# Patient Record
Sex: FEMALE | Race: WHITE | NOT HISPANIC OR LATINO
[De-identification: names, ages, dates, MRNs, and addresses within clinical notes are randomized per-mention and may not be internally consistent; named-entity substitution may affect disease eponyms.]

---

## 2017-06-05 ENCOUNTER — RESULT REVIEW (OUTPATIENT)
Age: 47
End: 2017-06-05

## 2018-03-16 ENCOUNTER — APPOINTMENT (OUTPATIENT)
Dept: INTERNAL MEDICINE | Facility: CLINIC | Age: 48
End: 2018-03-16

## 2019-04-05 ENCOUNTER — TRANSCRIPTION ENCOUNTER (OUTPATIENT)
Age: 49
End: 2019-04-05

## 2019-04-05 ENCOUNTER — APPOINTMENT (OUTPATIENT)
Age: 49
End: 2019-04-05
Payer: COMMERCIAL

## 2019-04-05 ENCOUNTER — ASOB RESULT (OUTPATIENT)
Age: 49
End: 2019-04-05

## 2019-04-05 PROCEDURE — 76857 US EXAM PELVIC LIMITED: CPT | Mod: 59

## 2019-04-05 PROCEDURE — 76830 TRANSVAGINAL US NON-OB: CPT

## 2020-05-05 ENCOUNTER — TRANSCRIPTION ENCOUNTER (OUTPATIENT)
Age: 50
End: 2020-05-05

## 2020-07-27 ENCOUNTER — TRANSCRIPTION ENCOUNTER (OUTPATIENT)
Age: 50
End: 2020-07-27

## 2020-10-07 ENCOUNTER — TRANSCRIPTION ENCOUNTER (OUTPATIENT)
Age: 50
End: 2020-10-07

## 2021-03-19 ENCOUNTER — TRANSCRIPTION ENCOUNTER (OUTPATIENT)
Age: 51
End: 2021-03-19

## 2021-03-20 ENCOUNTER — INPATIENT (INPATIENT)
Facility: HOSPITAL | Age: 51
LOS: 8 days | Discharge: HOME | DRG: 906 | End: 2021-03-29
Attending: PLASTIC SURGERY | Admitting: PLASTIC SURGERY
Payer: COMMERCIAL

## 2021-03-20 ENCOUNTER — EMERGENCY (EMERGENCY)
Facility: HOSPITAL | Age: 51
LOS: 0 days | Discharge: HOME | End: 2021-03-20
Attending: STUDENT IN AN ORGANIZED HEALTH CARE EDUCATION/TRAINING PROGRAM
Payer: COMMERCIAL

## 2021-03-20 VITALS
SYSTOLIC BLOOD PRESSURE: 144 MMHG | TEMPERATURE: 98 F | RESPIRATION RATE: 18 BRPM | HEART RATE: 88 BPM | DIASTOLIC BLOOD PRESSURE: 93 MMHG | OXYGEN SATURATION: 100 %

## 2021-03-20 VITALS
TEMPERATURE: 98 F | HEART RATE: 73 BPM | SYSTOLIC BLOOD PRESSURE: 134 MMHG | DIASTOLIC BLOOD PRESSURE: 106 MMHG | OXYGEN SATURATION: 100 % | RESPIRATION RATE: 18 BRPM

## 2021-03-20 VITALS
SYSTOLIC BLOOD PRESSURE: 148 MMHG | DIASTOLIC BLOOD PRESSURE: 97 MMHG | HEIGHT: 63 IN | HEART RATE: 76 BPM | RESPIRATION RATE: 16 BRPM | OXYGEN SATURATION: 100 % | WEIGHT: 115.08 LBS

## 2021-03-20 DIAGNOSIS — S68.125A PARTIAL TRAUMATIC METACARPOPHALANGEAL AMPUTATION OF LEFT RING FINGER, INITIAL ENCOUNTER: ICD-10-CM

## 2021-03-20 DIAGNOSIS — Z20.822 CONTACT WITH AND (SUSPECTED) EXPOSURE TO COVID-19: ICD-10-CM

## 2021-03-20 DIAGNOSIS — S68.115A COMPLETE TRAUMATIC METACARPOPHALANGEAL AMPUTATION OF LEFT RING FINGER, INITIAL ENCOUNTER: ICD-10-CM

## 2021-03-20 DIAGNOSIS — S68.119A COMPLETE TRAUMATIC METACARPOPHALANGEAL AMPUTATION OF UNSPECIFIED FINGER, INITIAL ENCOUNTER: ICD-10-CM
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
## 2021-03-20 DIAGNOSIS — Z23 ENCOUNTER FOR IMMUNIZATION: ICD-10-CM

## 2021-03-20 DIAGNOSIS — Z98.890 OTHER SPECIFIED POSTPROCEDURAL STATES: Chronic | ICD-10-CM

## 2021-03-20 LAB
ALBUMIN SERPL ELPH-MCNC: 3.8 G/DL — SIGNIFICANT CHANGE UP (ref 3.3–5)
ALP SERPL-CCNC: 38 U/L — LOW (ref 40–120)
ALT FLD-CCNC: 20 U/L — SIGNIFICANT CHANGE UP (ref 12–78)
ANION GAP SERPL CALC-SCNC: 11 MMOL/L — SIGNIFICANT CHANGE UP (ref 5–17)
ANION GAP SERPL CALC-SCNC: 4 MMOL/L — LOW (ref 5–17)
APTT BLD: 27.3 SEC — LOW (ref 27.5–35.5)
APTT BLD: 41.1 SEC — HIGH (ref 27.5–35.5)
AST SERPL-CCNC: 16 U/L — SIGNIFICANT CHANGE UP (ref 15–37)
BASE EXCESS BLDV CALC-SCNC: -0.7 MMOL/L — SIGNIFICANT CHANGE UP (ref -2–2)
BASOPHILS # BLD AUTO: 0.04 K/UL — SIGNIFICANT CHANGE UP (ref 0–0.2)
BASOPHILS NFR BLD AUTO: 0.8 % — SIGNIFICANT CHANGE UP (ref 0–2)
BILIRUB SERPL-MCNC: 0.6 MG/DL — SIGNIFICANT CHANGE UP (ref 0.2–1.2)
BLD GP AB SCN SERPL QL: NEGATIVE — SIGNIFICANT CHANGE UP
BLD GP AB SCN SERPL QL: SIGNIFICANT CHANGE UP
BUN SERPL-MCNC: 11 MG/DL — SIGNIFICANT CHANGE UP (ref 7–23)
BUN SERPL-MCNC: 17 MG/DL — SIGNIFICANT CHANGE UP (ref 7–23)
CA-I SERPL-SCNC: 1.12 MMOL/L — SIGNIFICANT CHANGE UP (ref 1.12–1.3)
CALCIUM SERPL-MCNC: 7.4 MG/DL — LOW (ref 8.4–10.5)
CALCIUM SERPL-MCNC: 9.3 MG/DL — SIGNIFICANT CHANGE UP (ref 8.5–10.1)
CHLORIDE BLDV-SCNC: 109 MMOL/L — HIGH (ref 96–108)
CHLORIDE SERPL-SCNC: 107 MMOL/L — SIGNIFICANT CHANGE UP (ref 96–108)
CHLORIDE SERPL-SCNC: 107 MMOL/L — SIGNIFICANT CHANGE UP (ref 96–108)
CO2 BLDV-SCNC: 23 MMOL/L — SIGNIFICANT CHANGE UP (ref 22–30)
CO2 SERPL-SCNC: 20 MMOL/L — LOW (ref 22–31)
CO2 SERPL-SCNC: 30 MMOL/L — SIGNIFICANT CHANGE UP (ref 22–31)
CREAT SERPL-MCNC: 0.72 MG/DL — SIGNIFICANT CHANGE UP (ref 0.5–1.3)
CREAT SERPL-MCNC: 1.07 MG/DL — SIGNIFICANT CHANGE UP (ref 0.5–1.3)
EOSINOPHIL # BLD AUTO: 0.13 K/UL — SIGNIFICANT CHANGE UP (ref 0–0.5)
EOSINOPHIL NFR BLD AUTO: 2.5 % — SIGNIFICANT CHANGE UP (ref 0–6)
GAS PNL BLDA: SIGNIFICANT CHANGE UP
GAS PNL BLDV: 139 MMOL/L — SIGNIFICANT CHANGE UP (ref 135–145)
GAS PNL BLDV: SIGNIFICANT CHANGE UP
GLUCOSE BLDV-MCNC: 99 MG/DL — SIGNIFICANT CHANGE UP (ref 70–99)
GLUCOSE SERPL-MCNC: 127 MG/DL — HIGH (ref 70–99)
GLUCOSE SERPL-MCNC: 93 MG/DL — SIGNIFICANT CHANGE UP (ref 70–99)
HCO3 BLDV-SCNC: 22 MMOL/L — SIGNIFICANT CHANGE UP (ref 21–29)
HCT VFR BLD CALC: 31.8 % — LOW (ref 34.5–45)
HCT VFR BLD CALC: 39.2 % — SIGNIFICANT CHANGE UP (ref 34.5–45)
HCT VFR BLDA CALC: 35 % — LOW (ref 39–50)
HGB BLD CALC-MCNC: 11.4 G/DL — LOW (ref 11.5–15.5)
HGB BLD-MCNC: 10.4 G/DL — LOW (ref 11.5–15.5)
HGB BLD-MCNC: 12.9 G/DL — SIGNIFICANT CHANGE UP (ref 11.5–15.5)
IMM GRANULOCYTES NFR BLD AUTO: 0.2 % — SIGNIFICANT CHANGE UP (ref 0–1.5)
INR BLD: 0.98 RATIO — SIGNIFICANT CHANGE UP (ref 0.88–1.16)
INR BLD: 0.99 RATIO — SIGNIFICANT CHANGE UP (ref 0.88–1.16)
LACTATE BLDV-MCNC: 0.6 MMOL/L — LOW (ref 0.7–2)
LYMPHOCYTES # BLD AUTO: 1.67 K/UL — SIGNIFICANT CHANGE UP (ref 1–3.3)
LYMPHOCYTES # BLD AUTO: 31.8 % — SIGNIFICANT CHANGE UP (ref 13–44)
MAGNESIUM SERPL-MCNC: 1.8 MG/DL — SIGNIFICANT CHANGE UP (ref 1.6–2.6)
MCHC RBC-ENTMCNC: 32.2 PG — SIGNIFICANT CHANGE UP (ref 27–34)
MCHC RBC-ENTMCNC: 32.4 PG — SIGNIFICANT CHANGE UP (ref 27–34)
MCHC RBC-ENTMCNC: 32.7 GM/DL — SIGNIFICANT CHANGE UP (ref 32–36)
MCHC RBC-ENTMCNC: 32.9 GM/DL — SIGNIFICANT CHANGE UP (ref 32–36)
MCV RBC AUTO: 98.5 FL — SIGNIFICANT CHANGE UP (ref 80–100)
MCV RBC AUTO: 98.5 FL — SIGNIFICANT CHANGE UP (ref 80–100)
MONOCYTES # BLD AUTO: 0.46 K/UL — SIGNIFICANT CHANGE UP (ref 0–0.9)
MONOCYTES NFR BLD AUTO: 8.8 % — SIGNIFICANT CHANGE UP (ref 2–14)
NEUTROPHILS # BLD AUTO: 2.94 K/UL — SIGNIFICANT CHANGE UP (ref 1.8–7.4)
NEUTROPHILS NFR BLD AUTO: 55.9 % — SIGNIFICANT CHANGE UP (ref 43–77)
NRBC # BLD: 0 /100 WBCS — SIGNIFICANT CHANGE UP (ref 0–0)
OTHER CELLS CSF MANUAL: 17 ML/DL — LOW (ref 18–22)
PCO2 BLDV: 33 MMHG — LOW (ref 35–50)
PH BLDV: 7.45 — SIGNIFICANT CHANGE UP (ref 7.35–7.45)
PHOSPHATE SERPL-MCNC: 3 MG/DL — SIGNIFICANT CHANGE UP (ref 2.5–4.5)
PLATELET # BLD AUTO: 199 K/UL — SIGNIFICANT CHANGE UP (ref 150–400)
PLATELET # BLD AUTO: 257 K/UL — SIGNIFICANT CHANGE UP (ref 150–400)
PO2 BLDV: 477 MMHG — HIGH (ref 25–45)
POTASSIUM BLDV-SCNC: 3.5 MMOL/L — SIGNIFICANT CHANGE UP (ref 3.5–5.3)
POTASSIUM SERPL-MCNC: 4 MMOL/L — SIGNIFICANT CHANGE UP (ref 3.5–5.3)
POTASSIUM SERPL-MCNC: 4 MMOL/L — SIGNIFICANT CHANGE UP (ref 3.5–5.3)
POTASSIUM SERPL-SCNC: 4 MMOL/L — SIGNIFICANT CHANGE UP (ref 3.5–5.3)
POTASSIUM SERPL-SCNC: 4 MMOL/L — SIGNIFICANT CHANGE UP (ref 3.5–5.3)
PROT SERPL-MCNC: 7.1 GM/DL — SIGNIFICANT CHANGE UP (ref 6–8.3)
PROTHROM AB SERPL-ACNC: 11.4 SEC — SIGNIFICANT CHANGE UP (ref 10.6–13.6)
PROTHROM AB SERPL-ACNC: 11.9 SEC — SIGNIFICANT CHANGE UP (ref 10.6–13.6)
RBC # BLD: 3.23 M/UL — LOW (ref 3.8–5.2)
RBC # BLD: 3.98 M/UL — SIGNIFICANT CHANGE UP (ref 3.8–5.2)
RBC # FLD: 13 % — SIGNIFICANT CHANGE UP (ref 10.3–14.5)
RBC # FLD: 13 % — SIGNIFICANT CHANGE UP (ref 10.3–14.5)
RH IG SCN BLD-IMP: NEGATIVE — SIGNIFICANT CHANGE UP
SAO2 % BLDV: 100 % — HIGH (ref 67–88)
SARS-COV-2 RNA SPEC QL NAA+PROBE: SIGNIFICANT CHANGE UP
SODIUM SERPL-SCNC: 138 MMOL/L — SIGNIFICANT CHANGE UP (ref 135–145)
SODIUM SERPL-SCNC: 141 MMOL/L — SIGNIFICANT CHANGE UP (ref 135–145)
WBC # BLD: 5.25 K/UL — SIGNIFICANT CHANGE UP (ref 3.8–10.5)
WBC # BLD: 6.99 K/UL — SIGNIFICANT CHANGE UP (ref 3.8–10.5)
WBC # FLD AUTO: 5.25 K/UL — SIGNIFICANT CHANGE UP (ref 3.8–10.5)
WBC # FLD AUTO: 6.99 K/UL — SIGNIFICANT CHANGE UP (ref 3.8–10.5)

## 2021-03-20 PROCEDURE — 90715 TDAP VACCINE 7 YRS/> IM: CPT

## 2021-03-20 PROCEDURE — 86850 RBC ANTIBODY SCREEN: CPT

## 2021-03-20 PROCEDURE — 20822 REPLANTATION DIGIT COMPLETE: CPT | Mod: F3

## 2021-03-20 PROCEDURE — 36415 COLL VENOUS BLD VENIPUNCTURE: CPT

## 2021-03-20 PROCEDURE — U0003: CPT

## 2021-03-20 PROCEDURE — U0005: CPT

## 2021-03-20 PROCEDURE — 96375 TX/PRO/DX INJ NEW DRUG ADDON: CPT

## 2021-03-20 PROCEDURE — 85610 PROTHROMBIN TIME: CPT

## 2021-03-20 PROCEDURE — 85730 THROMBOPLASTIN TIME PARTIAL: CPT

## 2021-03-20 PROCEDURE — 85025 COMPLETE CBC W/AUTO DIFF WBC: CPT

## 2021-03-20 PROCEDURE — 29125 APPL SHORT ARM SPLINT STATIC: CPT | Mod: 59

## 2021-03-20 PROCEDURE — 90471 IMMUNIZATION ADMIN: CPT

## 2021-03-20 PROCEDURE — 73130 X-RAY EXAM OF HAND: CPT | Mod: LT

## 2021-03-20 PROCEDURE — 35236 REPAIR BLVSL VN GRF UXTR: CPT

## 2021-03-20 PROCEDURE — 99285 EMERGENCY DEPT VISIT HI MDM: CPT

## 2021-03-20 PROCEDURE — 86901 BLOOD TYPING SEROLOGIC RH(D): CPT

## 2021-03-20 PROCEDURE — 86900 BLOOD TYPING SEROLOGIC ABO: CPT

## 2021-03-20 PROCEDURE — 64912 NRV RPR W/NRV ALGRFT 1ST: CPT | Mod: F3

## 2021-03-20 PROCEDURE — 96374 THER/PROPH/DIAG INJ IV PUSH: CPT

## 2021-03-20 PROCEDURE — 80053 COMPREHEN METABOLIC PANEL: CPT

## 2021-03-20 PROCEDURE — 96376 TX/PRO/DX INJ SAME DRUG ADON: CPT

## 2021-03-20 PROCEDURE — 73130 X-RAY EXAM OF HAND: CPT | Mod: 26,LT

## 2021-03-20 RX ORDER — CHLORHEXIDINE GLUCONATE 213 G/1000ML
1 SOLUTION TOPICAL
Refills: 0 | Status: DISCONTINUED | OUTPATIENT
Start: 2021-03-21 | End: 2021-03-29

## 2021-03-20 RX ORDER — CEFAZOLIN SODIUM 1 G
1000 VIAL (EA) INJECTION ONCE
Refills: 0 | Status: DISCONTINUED | OUTPATIENT
Start: 2021-03-20 | End: 2021-03-20

## 2021-03-20 RX ORDER — CEFAZOLIN SODIUM 1 G
1000 VIAL (EA) INJECTION ONCE
Refills: 0 | Status: COMPLETED | OUTPATIENT
Start: 2021-03-20 | End: 2021-03-20

## 2021-03-20 RX ORDER — ENOXAPARIN SODIUM 100 MG/ML
40 INJECTION SUBCUTANEOUS DAILY
Refills: 0 | Status: DISCONTINUED | OUTPATIENT
Start: 2021-03-20 | End: 2021-03-29

## 2021-03-20 RX ORDER — ONDANSETRON 8 MG/1
4 TABLET, FILM COATED ORAL ONCE
Refills: 0 | Status: COMPLETED | OUTPATIENT
Start: 2021-03-20 | End: 2021-03-20

## 2021-03-20 RX ORDER — SODIUM CHLORIDE 9 MG/ML
1000 INJECTION INTRAMUSCULAR; INTRAVENOUS; SUBCUTANEOUS ONCE
Refills: 0 | Status: COMPLETED | OUTPATIENT
Start: 2021-03-20 | End: 2021-03-20

## 2021-03-20 RX ORDER — POLYETHYLENE GLYCOL 3350 17 G/17G
17 POWDER, FOR SOLUTION ORAL DAILY
Refills: 0 | Status: DISCONTINUED | OUTPATIENT
Start: 2021-03-20 | End: 2021-03-29

## 2021-03-20 RX ORDER — ENOXAPARIN SODIUM 100 MG/ML
30 INJECTION SUBCUTANEOUS ONCE
Refills: 0 | Status: DISCONTINUED | OUTPATIENT
Start: 2021-03-20 | End: 2021-03-20

## 2021-03-20 RX ORDER — SENNA PLUS 8.6 MG/1
2 TABLET ORAL AT BEDTIME
Refills: 0 | Status: DISCONTINUED | OUTPATIENT
Start: 2021-03-20 | End: 2021-03-29

## 2021-03-20 RX ORDER — TETANUS TOXOID, REDUCED DIPHTHERIA TOXOID AND ACELLULAR PERTUSSIS VACCINE, ADSORBED 5; 2.5; 8; 8; 2.5 [IU]/.5ML; [IU]/.5ML; UG/.5ML; UG/.5ML; UG/.5ML
0.5 SUSPENSION INTRAMUSCULAR ONCE
Refills: 0 | Status: COMPLETED | OUTPATIENT
Start: 2021-03-20 | End: 2021-03-20

## 2021-03-20 RX ORDER — MORPHINE SULFATE 50 MG/1
2 CAPSULE, EXTENDED RELEASE ORAL ONCE
Refills: 0 | Status: DISCONTINUED | OUTPATIENT
Start: 2021-03-20 | End: 2021-03-20

## 2021-03-20 RX ORDER — SODIUM CHLORIDE 9 MG/ML
1000 INJECTION, SOLUTION INTRAVENOUS
Refills: 0 | Status: DISCONTINUED | OUTPATIENT
Start: 2021-03-20 | End: 2021-03-20

## 2021-03-20 RX ORDER — MAGNESIUM SULFATE 500 MG/ML
2 VIAL (ML) INJECTION ONCE
Refills: 0 | Status: COMPLETED | OUTPATIENT
Start: 2021-03-20 | End: 2021-03-20

## 2021-03-20 RX ORDER — CIPROFLOXACIN LACTATE 400MG/40ML
500 VIAL (ML) INTRAVENOUS EVERY 12 HOURS
Refills: 0 | Status: DISCONTINUED | OUTPATIENT
Start: 2021-03-21 | End: 2021-03-29

## 2021-03-20 RX ORDER — MORPHINE SULFATE 50 MG/1
4 CAPSULE, EXTENDED RELEASE ORAL ONCE
Refills: 0 | Status: DISCONTINUED | OUTPATIENT
Start: 2021-03-20 | End: 2021-03-20

## 2021-03-20 RX ORDER — ASPIRIN/CALCIUM CARB/MAGNESIUM 324 MG
81 TABLET ORAL DAILY
Refills: 0 | Status: DISCONTINUED | OUTPATIENT
Start: 2021-03-21 | End: 2021-03-29

## 2021-03-20 RX ORDER — ACETAMINOPHEN 500 MG
650 TABLET ORAL EVERY 6 HOURS
Refills: 0 | Status: DISCONTINUED | OUTPATIENT
Start: 2021-03-20 | End: 2021-03-29

## 2021-03-20 RX ORDER — ENOXAPARIN SODIUM 100 MG/ML
40 INJECTION SUBCUTANEOUS ONCE
Refills: 0 | Status: DISCONTINUED | OUTPATIENT
Start: 2021-03-20 | End: 2021-03-20

## 2021-03-20 RX ORDER — OXYCODONE HYDROCHLORIDE 5 MG/1
5 TABLET ORAL EVERY 4 HOURS
Refills: 0 | Status: DISCONTINUED | OUTPATIENT
Start: 2021-03-20 | End: 2021-03-24

## 2021-03-20 RX ADMIN — TETANUS TOXOID, REDUCED DIPHTHERIA TOXOID AND ACELLULAR PERTUSSIS VACCINE, ADSORBED 0.5 MILLILITER(S): 5; 2.5; 8; 8; 2.5 SUSPENSION INTRAMUSCULAR at 13:41

## 2021-03-20 RX ADMIN — ONDANSETRON 4 MILLIGRAM(S): 8 TABLET, FILM COATED ORAL at 12:52

## 2021-03-20 RX ADMIN — Medication 1000 MILLIGRAM(S): at 12:45

## 2021-03-20 RX ADMIN — Medication 50 GRAM(S): at 23:32

## 2021-03-20 RX ADMIN — MORPHINE SULFATE 2 MILLIGRAM(S): 50 CAPSULE, EXTENDED RELEASE ORAL at 14:00

## 2021-03-20 RX ADMIN — SODIUM CHLORIDE 1000 MILLILITER(S): 9 INJECTION INTRAMUSCULAR; INTRAVENOUS; SUBCUTANEOUS at 12:52

## 2021-03-20 RX ADMIN — MORPHINE SULFATE 4 MILLIGRAM(S): 50 CAPSULE, EXTENDED RELEASE ORAL at 13:41

## 2021-03-20 RX ADMIN — MORPHINE SULFATE 4 MILLIGRAM(S): 50 CAPSULE, EXTENDED RELEASE ORAL at 12:51

## 2021-03-20 RX ADMIN — OXYCODONE HYDROCHLORIDE 5 MILLIGRAM(S): 5 TABLET ORAL at 23:32

## 2021-03-20 NOTE — ED ADULT NURSE NOTE - OBJECTIVE STATEMENT
Patient presents to the ED BIBEMS s/p slip and fall down 9 steps 45 min PTA. Pt was wearing socks while going down the steps, slipped, grabbed onto banister but kept sliding incurring +amputation of distal L 4th finger. Notes associated +pain to finger and LUE. No head injury or LOC. EMS with tip of finger wrapped in guaze on ice. Last Tetanus unknown. NKDA.

## 2021-03-20 NOTE — ED PROVIDER NOTE - CLINICAL SUMMARY MEDICAL DECISION MAKING FREE TEXT BOX
Attending Azucena Russell: 49 y/o female transferred after finger amputation. upon arrival met by reimplantation team. will take to or

## 2021-03-20 NOTE — H&P ADULT - ASSESSMENT
A/P: 50F w severed L RF at mid-P2; good candidate for replantation    - NPO  - Received TDaP at [place]  - IV Ancef  - OR for emergency replantation  - Consent signed and in chart  - Preop labs and w/u  - Anticipate SICU for postop recover  '  Seen and d/w Dr. Valdes

## 2021-03-20 NOTE — ED ADULT NURSE NOTE - NS ED NURSE DISCH DISPOSITION
Follow up visit to patients room. Patient wants to go home with hospice tomorrow. Patient is not sure what time to have equipment delivered. She asked that I speak with her . I tried to call [facility], phone goes straight to voicemail and the voicemail is full so I am unable to leave a message. I spoke with patient again and she was able to reach [employer]. [facility] Holley Escotoanita Final states that equipment can be delivered tomorrow 2-5pm as he will need to call the other company to  the equipment they have now. I explained that we will follow up tomorrow to finish discharge home tomorrow. Admitted

## 2021-03-20 NOTE — ED PROVIDER NOTE - OBJECTIVE STATEMENT
Attending Azucena Russell: 51 y/o female presenting with left finger pain. pt states tripped and fell and got her finger stuck in banister. and noticed that her finger was amputed. went to outside hospital and had xrays performed, received abx and transferred for reimplantation team. pt has finger with her. no h/o bleeding disroders. pt received pain control prior to arrival. denies any further injury

## 2021-03-20 NOTE — ED PROVIDER NOTE - ATTENDING CONTRIBUTION TO CARE
Attending MD Azucena Russell:  I personally have seen and examined this patient.  Resident note reviewed and agree on plan of care and except where noted.  See HPI, PE, and MDM for details.

## 2021-03-20 NOTE — ED ADULT TRIAGE NOTE - CHIEF COMPLAINT QUOTE
Pt. to the ED BIBA from Home C/O Left Hand finger injury (Amputation)-- Pt. states she trip and fell over steps and had finger injury- denies hitting head and LOC- Denies major medical hx and Blood thinners- GSC 15- TA to ED Called @ 1219pm

## 2021-03-20 NOTE — ED PROVIDER NOTE - OBJECTIVE STATEMENT
49 y/o F with PMHx of presents to the ED BIBEMS s/p slip and fall down 9 steps 45 min PTA. Pt was wearing socks while going down the steps, slipped, grabbed onto banister but kept sliding incurring +amputation of distal L 4th finger. Notes associated +pain to finger and LUE. No head injury or LOC. EMS with tip of finger wrapped in guaze on ice. Last Tetanus unknown. NKDA.

## 2021-03-20 NOTE — BRIEF OPERATIVE NOTE - OPERATION/FINDINGS
Replantation of severed left ring finger; vein graft from distal volar forearm; repair of radial and ulnar digital nerves with interpositional nerve graft; removal of nail plate

## 2021-03-20 NOTE — ED PROVIDER NOTE - PROGRESS NOTE DETAILS
Spoke with Dr. Denise- recommends transfer to [facility] to care of Dr. Sommers who he spoke with. pt agrees with transfer.

## 2021-03-20 NOTE — H&P ADULT - NSHPPHYSICALEXAM_GEN_ALL_CORE
GEN: Well-appearing, overall atraumatic; A&Ox3  LUE: Volar oblique severed L RF at the mid-middle phalanx; amputated part on ice

## 2021-03-20 NOTE — ED PROVIDER NOTE - PHYSICAL EXAMINATION
Attending Azucena Russell: Gen: NAD, heent: atrauamtic, eomi, perrla, mmm, op pink, uvula midline, neck; nttp, no nuchal rigidity, chest: nttp, no crepitus, cv: rrr, no murmurs, lungs: ctab, abd: soft, nontender, nondistended, no peritoneal signs, +BS, no guarding, ext: wwp, amputation of 4th digits left hand, hand bandaged , skin: no rash, neuro: awake and alert, following commands, speech clear, sensation and strength intact, no focal deficits   platic surgery at bedside do not want to unrap bandage plan to do it in or

## 2021-03-20 NOTE — H&P ADULT - HISTORY OF PRESENT ILLNESS
HPI: 50F LHD slipped down the stairs early today; used her left hand to try to grab the railings and as a result had her left ring finger avulsed from her hand. She was taken to [facility]; Xray and eval by an orthopedic surgeon was performed; deemed an appropriate replantation candidate and was transferred by helicopter to [facility].    At the time of consultation in the ER, pt was alert and comfortable and able to give full consent to the operative procedure.

## 2021-03-20 NOTE — CONSULT NOTE ADULT - SUBJECTIVE AND OBJECTIVE BOX
HISTORY OF PRESENT ILLNESS:  49 y/o female w/ a PMHx of depression who presented today to [facility] after slipping down the stairs. Patient attempted to grab the railing to break her fall, which resulted in amputation of the left ring finger. She was subsequently transferred to [facility] for reimplantation. Case was uneventful. SICU consulted for leech therapy and frequent neurovascular checks. Patient currently denies fevers, headache, dizziness, weakness, shortness of breath, chest pain, abdominal pain, or nausea/vomiting.    PAST MEDICAL HISTORY: Depression    PAST SURGICAL HISTORY:  - H/O bilateral breast reduction surgery  - H/O sinus surgery    FAMILY HISTORY: No pertinent family history in first degree relatives    SOCIAL HISTORY:    CODE STATUS:     HOME MEDICATIONS:    ALLERGIES: No Known Allergies      VITAL SIGNS:  ICU Vital Signs Last 24 Hrs  T(C): 36.1 (20 Mar 2021 21:55), Max: 36.7 (20 Mar 2021 15:40)  T(F): 97 (20 Mar 2021 21:55), Max: 98 (20 Mar 2021 15:40)  HR: 71 (20 Mar 2021 22:30) (71 - 77)  BP: 100/58 (20 Mar 2021 22:30) (100/58 - 134/106)  BP(mean): 75 (20 Mar 2021 22:30) (75 - 83)  ABP: --  ABP(mean): --  RR: 15 (20 Mar 2021 22:30) (15 - 18)  SpO2: 100% (20 Mar 2021 22:30) (100% - 100%)      NEURO  Exam:  Meds:acetaminophen   Tablet .. 650 milliGRAM(s) Oral every 6 hours PRN Mild Pain (1 - 3)  oxyCODONE    IR 5 milliGRAM(s) Oral every 4 hours PRN Severe Pain (7 - 10)      RESPIRATORY  Mechanical Ventilation:     Exam:  Meds:    CARDIOVASCULAR  VBG - ( 20 Mar 2021 17:44 )  pH: 7.45  /  pCO2: 33    /  pO2: 477   / HCO3: 22    / Base Excess: -0.7  /  SaO2: 100    Lactate: 0.6              Exam:  Cardiac Rhythm:  Meds:    GI/NUTRITION  Exam:  Diet:  Meds:polyethylene glycol 3350 17 Gram(s) Oral daily  senna 2 Tablet(s) Oral at bedtime      GENITOURINARY/RENAL  Meds:    03-20 @ 07:01  -  03-20 @ 22:50  --------------------------------------------------------  IN:  Total IN: 0 mL    OUT:    Indwelling Catheter - Urethral (mL): 75 mL  Total OUT: 75 mL    Total NET: -75 mL        Weight (kg): 44.3 (03-20 @ 21:55)        [ ] Croft catheter, indication: urine output monitoring in critically ill patient    HEMATOLOGIC  [ ] VTE Prophylaxis:  enoxaparin Injectable 40 milliGRAM(s) SubCutaneous daily                          10.4   6.99  )-----------( 199      ( 20 Mar 2021 22:26 )             31.8     PT/INR - ( 20 Mar 2021 22:26 )   PT: 11.9 sec;   INR: 0.99 ratio         PTT - ( 20 Mar 2021 22:26 )  PTT:41.1 sec  Transfusion: [ ] PRBC	[ ] Platelets	[ ] FFP	[ ] Cryoprecipitate      INFECTIOUS DISEASES  Meds:  RECENT CULTURES:      ENDOCRINE  Meds:  CAPILLARY BLOOD GLUCOSE          PATIENT CARE ACCESS DEVICES:  [ ] Peripheral IV  [ ] Central Venous Line	[ ] R	[ ] L	[ ] IJ	[ ] Fem	[ ] SC	Placed:   [ ] Arterial Line		[ ] R	[ ] L	[ ] Fem	[ ] Rad	[ ] Ax	Placed:   [ ] PICC:					[ ] Mediport  [ ] Urinary Catheter, Date Placed:   [x] Necessity of urinary, arterial, and venous catheters discussed    OTHER MEDICATIONS:     IMAGING STUDIES: HISTORY OF PRESENT ILLNESS:  51 y/o female w/ a PMHx of depression who presented today to [facility] after slipping down the stairs. Patient attempted to grab the railing to break her fall, which resulted in amputation of the left ring finger. She was subsequently transferred to [facility] for reimplantation. Case was uneventful. SICU consulted for leech therapy and frequent neurovascular checks. Patient currently denies fevers, headache, dizziness, weakness, shortness of breath, chest pain, abdominal pain, or nausea/vomiting.    PAST MEDICAL HISTORY: Depression    PAST SURGICAL HISTORY:  - H/O bilateral breast reduction surgery  - H/O sinus surgery    HOME MEDICATIONS: Wellbutrin    ALLERGIES: No Known Allergies    FAMILY HISTORY: No pertinent family history in first degree relatives    SOCIAL HISTORY: Non-contributory    VITAL SIGNS:  T(C): 36.1 (20 Mar 2021 21:55), Max: 36.7 (20 Mar 2021 15:40)  T(F): 97 (20 Mar 2021 21:55), Max: 98 (20 Mar 2021 15:40)  HR: 71 (20 Mar 2021 22:30) (71 - 77)  BP: 100/58 (20 Mar 2021 22:30) (100/58 - 134/106)  BP(mean): 75 (20 Mar 2021 22:30) (75 - 83)  RR: 15 (20 Mar 2021 22:30) (15 - 18)  SpO2: 100% (20 Mar 2021 22:30) (100% - 100%)    PHYSICAL EXAMINATION:  General - well-nourished, no acute distress  Neuro - awake, alert, oriented x4, no acute focal deficits  HEENT - normocephalic, PERRL, moist mucous membranes  Lungs - clear to auscultation bilaterally  Heart - regular rate and rhythm, S1S2  Abdomen - soft, nontender, nondistended  Extremities - left ring finger pink w/ good capillary refill, all other extremities are warm & pink with 2+ pulses, strength 5/5, sensation intact    LABS:                        10.4   6.99  )-----------( 199      ( 20 Mar 2021 22:26 )             31.8     03-20    138  |  107  |  11  ----------------------------<  127<H>  4.0   |  20<L>  |  0.72    Ca    7.4<L>      20 Mar 2021 22:26  Phos  3.0  Mg     1.8    PT/INR - ( 20 Mar 2021 22:26 )   PT: 11.9 sec;   INR: 0.99 ratio    PTT - ( 20 Mar 2021 22:26 )  PTT:41.1 sec    IMAGING STUDIES: None

## 2021-03-20 NOTE — ED PROVIDER NOTE - NS_ATTENDINGSCRIBE_ED_ALL_ED
I personally performed the service described in the documentation recorded by the scribe in my presence, and it accurately and completely records my words and actions.
Has The Growth Been Previously Biopsied?: has been previously biopsied
Body Location Override (Optional): left central lateral neck

## 2021-03-20 NOTE — ED PROVIDER NOTE - NS ED ROS FT
Constitutional: No fever.  Neurological: No headache.  Eyes: No vision changes.   Ears, Nose, Mouth, Throat: No congestion.  Cardiovascular: No chest pain.  Respiratory: No difficulty breathing.  Gastrointestinal: No nausea or vomiting.  Genitourinary: No dysuria.  Musculoskeletal: +L 4th finger amputation, +LUE pain  Integumentary (skin and/or breast): No rash.

## 2021-03-20 NOTE — ED PROVIDER NOTE - PHYSICAL EXAMINATION
Vital signs as available reviewed.  General:  Comfortable, no acute distress.  Head:  Normocephalic, atraumatic.  Eyes:  Conjunctiva pink, no icterus.  Cardiovascular:  Regular rate, no obvious murmur.  Respiratory:  Clear to auscultation, good air entry bilaterally.  Abdomen:  Soft, non-tender.  Musculoskeletal:  No deformity or calf tenderness. No midline spinal TTP.   Neurologic: Alert and oriented, moving all extremities.  Skin: +avulsion/amputation distal L 4th digit

## 2021-03-20 NOTE — CONSULT NOTE ADULT - ASSESSMENT
49 y/o female w/ a PMHx of depression presenting s/p fall w/ amputation of the left ring finger s/p reimplantation requiring frequent neurovascular checks    PLAN:    Neuro: acute post-op pain, depression  - Monitor mental status  - Pain control as needed with acetaminophen and oxycodone  - Will restart home Wellbutrin    Resp: no acute issues  - Monitor pulse oximeter  - Out of bed to chair, ambulate as tolerated, and incentive spirometry to prevent atelectasis    CV: no acute issues  - Monitor vital signs    GI: no acute issues  - Regular diet as tolerated  - Bowel regimen with senna & Miralax    Renal: no acute issues  - Monitor I&Os  - Monitor electrolytes and replete as necessary    Heme: s/p finger reimplantation  - Monitor CBC and coags  - Lovenox for VTE prophylaxis  - ASA 81 mg PO daily as per plastics in the setting of her reimplanted finger    ID: leech therapy  - Monitor for clinical evidence of active infection  - Empiric ciprofloxacin while on leech therapy    Endo: no acute issues  - Monitor glucose on BMP    Code Status: Full code    [facility]     [id number]

## 2021-03-21 LAB
HCT VFR BLD CALC: 30 % — LOW (ref 34.5–45)
HGB BLD-MCNC: 9.8 G/DL — LOW (ref 11.5–15.5)
MCHC RBC-ENTMCNC: 32.7 GM/DL — SIGNIFICANT CHANGE UP (ref 32–36)
MCHC RBC-ENTMCNC: 32.7 PG — SIGNIFICANT CHANGE UP (ref 27–34)
MCV RBC AUTO: 100 FL — SIGNIFICANT CHANGE UP (ref 80–100)
NRBC # BLD: 0 /100 WBCS — SIGNIFICANT CHANGE UP (ref 0–0)
PLATELET # BLD AUTO: 212 K/UL — SIGNIFICANT CHANGE UP (ref 150–400)
RBC # BLD: 3 M/UL — LOW (ref 3.8–5.2)
RBC # FLD: 13.2 % — SIGNIFICANT CHANGE UP (ref 10.3–14.5)
WBC # BLD: 8.3 K/UL — SIGNIFICANT CHANGE UP (ref 3.8–10.5)
WBC # FLD AUTO: 8.3 K/UL — SIGNIFICANT CHANGE UP (ref 3.8–10.5)

## 2021-03-21 PROCEDURE — 99232 SBSQ HOSP IP/OBS MODERATE 35: CPT

## 2021-03-21 RX ADMIN — OXYCODONE HYDROCHLORIDE 5 MILLIGRAM(S): 5 TABLET ORAL at 19:58

## 2021-03-21 RX ADMIN — OXYCODONE HYDROCHLORIDE 5 MILLIGRAM(S): 5 TABLET ORAL at 00:00

## 2021-03-21 RX ADMIN — Medication 500 MILLIGRAM(S): at 05:09

## 2021-03-21 RX ADMIN — ENOXAPARIN SODIUM 40 MILLIGRAM(S): 100 INJECTION SUBCUTANEOUS at 11:51

## 2021-03-21 RX ADMIN — Medication 81 MILLIGRAM(S): at 11:51

## 2021-03-21 RX ADMIN — CHLORHEXIDINE GLUCONATE 1 APPLICATION(S): 213 SOLUTION TOPICAL at 05:09

## 2021-03-21 RX ADMIN — Medication 500 MILLIGRAM(S): at 17:15

## 2021-03-21 RX ADMIN — OXYCODONE HYDROCHLORIDE 5 MILLIGRAM(S): 5 TABLET ORAL at 20:30

## 2021-03-21 NOTE — OCCUPATIONAL THERAPY INITIAL EVALUATION ADULT - LIGHT TOUCH SENSATION, LUE, REHAB EVAL
digit IV, identifies tapping at finger tip, unable to assess otherwise due to dressing/cast/moderate impairment

## 2021-03-21 NOTE — OCCUPATIONAL THERAPY INITIAL EVALUATION ADULT - ADL RETRAINING, OT EVAL
Patient will dress UB/LB including fasteners (I) within 2 weeks. Pt will self feed (I) within 2 weeks. Pt will perform grooming tasks (I) within 2 weeks.

## 2021-03-21 NOTE — PROGRESS NOTE ADULT - ASSESSMENT
49 y/o female w/ a PMHx of depression presenting s/p fall w/ amputation of the left ring finger s/p reimplantation requiring frequent neurovascular checks    PLAN:    Neuro: acute post-op pain, depression  - Monitor mental status  - Pain control as needed with acetaminophen and oxycodone  - Will restart home Wellbutrin    Resp: no acute issues  - Monitor pulse oximeter  - Out of bed to chair, ambulate as tolerated, and incentive spirometry to prevent atelectasis    CV: no acute issues  - Monitor vital signs    GI: no acute issues  - Regular diet as tolerated  - Bowel regimen with senna & Miralax    Renal: no acute issues  - Monitor I&Os  - Monitor electrolytes and replete as necessary    Heme: s/p finger reimplantation  - Monitor CBC and coags  - Lovenox for VTE prophylaxis  - ASA 81 mg PO daily as per plastics in the setting of her reimplanted finger    ID: leech therapy  - Monitor for clinical evidence of active infection  - Empiric ciprofloxacin while on leech therapy    Endo: no acute issues  - Monitor glucose on BMP    Code Status: Full code    [facility]     [id number]

## 2021-03-21 NOTE — OCCUPATIONAL THERAPY INITIAL EVALUATION ADULT - RANGE OF MOTION EXAMINATION, UPPER EXTREMITY
L shoulder and elbow AROM WNL. Forearm and wrist N/T due to cast. Digit ROM limited by casting/Right UE Active ROM was WNL (within normal limits)

## 2021-03-21 NOTE — PROGRESS NOTE ADULT - SUBJECTIVE AND OBJECTIVE BOX
HISTORY:  51 y/o female w/ a PMHx of depression who presented today to [facility] after slipping down the stairs. Patient attempted to grab the railing to break her fall, which resulted in amputation of the left ring finger. She was subsequently transferred to [facility] for reimplantation. Case was uneventful. Patient admitted to SICU post-operatively for leech therapy and frequent neurovascular checks. Patient currently denies fevers, headache, dizziness, weakness, shortness of breath, chest pain, abdominal pain, or nausea/vomiting.    24 HOUR EVENTS: Leeches successfully latching every 2 hours    SUBJECTIVE/ROS:  [x] A ten-point review of systems was otherwise negative except as noted.  [ ] Due to altered mental status/intubation, subjective information were not able to be obtained from the patient. History was obtained, to the extent possible, from review of the chart and collateral sources of information.    NEURO  Exam: awake, alert, oriented x4, no acute distress, no focal deficits  Meds:  - acetaminophen   Tablet .. 650 milliGRAM(s) Oral every 6 hours PRN Mild Pain (1 - 3)  - oxyCODONE    IR 5 milliGRAM(s) Oral every 4 hours PRN Severe Pain (7 - 10)  [x] Adequacy of sedation and pain control has been assessed and adjusted    RESPIRATORY  RR: 11 (03-21-21 @ 02:00) (11 - 18)  SpO2: 100% (03-21-21 @ 02:00) (100% - 100%)  Exam: clear to auscultation bilaterally  Mechanical Ventilation: no  [N/A] Extubation Readiness Assessed  Meds: none    CARDIOVASCULAR  HR: 57 (03-21-21 @ 02:00) (55 - 78)  BP: 111/58 (03-21-21 @ 02:00) (97/53 - 134/106)  BP(mean): 80 (03-21-21 @ 02:00) (68 - 83)  Exam: regular rate and rhythm, S1S2  Cardiac Rhythm: sinus  Perfusion    [x]Adequate    [ ]Inadequate  Mentation   [x]Normal       [ ]Reduced  Extremities  [x]Warm         [ ]Cool  Volume Status [ ]Hypervolemic [x]Euvolemic [ ]Hypovolemic  Meds: none    GI/NUTRITION  Exam: soft, nontender, nondistended  Diet: regular  Meds:  - polyethylene glycol 3350 17 Gram(s) Oral daily  - senna 2 Tablet(s) Oral at bedtime    GENITOURINARY  I&O's Detail    03-20 @ 07:01  -  03-21 @ 02:53  --------------------------------------------------------  IN:    IV PiggyBack: 50 mL  Total IN: 50 mL    OUT:    Indwelling Catheter - Urethral (mL): 320 mL  Total OUT: 320 mL    Total NET: -270 mL    138  |  107  |  11  ----------------------------<  127<H>  4.0   |  20<L>  |  0.72    Ca    7.4<L>      20 Mar 2021 22:26  Phos  3.0  Mg     1.8    [x] Croft catheter, indication: perioperative use for selected surgical procedures  Meds: none    HEMATOLOGIC  Meds:  - aspirin enteric coated 81 milliGRAM(s) Oral daily  - enoxaparin Injectable 40 milliGRAM(s) SubCutaneous daily  [x] VTE Prophylaxis                        10.4   6.99  )-----------( 199      ( 20 Mar 2021 22:26 )             31.8     PT/INR - ( 20 Mar 2021 22:26 )   PT: 11.9 sec;   INR: 0.99 ratio    PTT - ( 20 Mar 2021 22:26 )  PTT:41.1 sec    INFECTIOUS DISEASES  T(C): 36.4 (03-20-21 @ 23:00), Max: 36.7 (03-20-21 @ 15:40)  WBC Count: 6.99 K/uL (03-20 @ 22:26)  Recent Cultures: none  Meds: ciprofloxacin     Tablet 500 milliGRAM(s) Oral every 12 hours    ENDOCRINE  Capillary Blood Glucose: none  Meds: none    ACCESS DEVICES:  [x] Peripheral IV  [ ] Central Venous Line	[ ] R	[ ] L	[ ] IJ	[ ] Fem	[ ] SC	Placed:   [ ] Arterial Line		[ ] R	[ ] L	[ ] Fem	[ ] Rad	[ ] Ax	Placed:   [ ] PICC:					[ ] Mediport  [x] Urinary Catheter, Date Placed: 3/20  [x] Necessity of urinary, arterial, and venous catheters discussed    OTHER MEDICATIONS: chlorhexidine 2% Cloths 1 Application(s) Topical <User Schedule>    CODE STATUS: Full code    IMAGING:

## 2021-03-21 NOTE — OCCUPATIONAL THERAPY INITIAL EVALUATION ADULT - MANUAL MUSCLE TESTING RESULTS, REHAB EVAL
B/L LE 5/5; RUE 5/5; L shoulder and elbow at least 3/5. Forearm and wrist N/T. L digits at least 2-/5 at DIP, limited by cast

## 2021-03-21 NOTE — OCCUPATIONAL THERAPY INITIAL EVALUATION ADULT - PERTINENT HX OF CURRENT PROBLEM, REHAB EVAL
50F LHD slipped down the stairs early today; used her left hand to try to grab the railings and as a result had her left ring finger avulsed from her hand. She was taken to [facility]; Xray and eval by an orthopedic surgeon was performed; deemed an appropriate replantation candidate and was transferred by helicopter to [facility].

## 2021-03-21 NOTE — PROGRESS NOTE ADULT - SUBJECTIVE AND OBJECTIVE BOX
Plastic Surgery Progress Note (pg [facility]: [id number], [facility]: [phone])    SUBJECTIVE  The patient was seen and examined. POD 1 s/p left ring finger replant. Recovering appropriately.     OBJECTIVE  ___________________________________________________  VITAL SIGNS / I&O's   Vital Signs Last 24 Hrs  T(C): 36.4 (21 Mar 2021 07:00), Max: 36.7 (20 Mar 2021 15:40)  T(F): 97.5 (21 Mar 2021 07:00), Max: 98 (20 Mar 2021 15:40)  HR: 78 (21 Mar 2021 10:00) (53 - 84)  BP: 118/67 (21 Mar 2021 10:00) (97/53 - 134/106)  BP(mean): 82 (21 Mar 2021 10:00) (68 - 87)  RR: 21 (21 Mar 2021 10:00) (11 - 22)  SpO2: 100% (21 Mar 2021 10:00) (100% - 100%)      20 Mar 2021 07:01  -  21 Mar 2021 07:00  --------------------------------------------------------  IN:    IV PiggyBack: 50 mL  Total IN: 50 mL    OUT:    Indwelling Catheter - Urethral (mL): 970 mL  Total OUT: 970 mL    Total NET: -920 mL      21 Mar 2021 07:01  -  21 Mar 2021 10:47  --------------------------------------------------------  IN:  Total IN: 0 mL    OUT:    Indwelling Catheter - Urethral (mL): 275 mL  Total OUT: 275 mL    Total NET: -275 mL        ___________________________________________________  PHYSICAL EXAM    -- CONSTITUTIONAL: NAD, lying in bed  -- NEURO: Awake, alert  -- PULM: Non-labored respirations  -- EXTREMITIES: L hand in splint, ring finger intact, good bleeding at nail plate, replanted digit does not appear congested    ___________________________________________________  LABS                        9.8    8.30  )-----------( 212      ( 21 Mar 2021 10:16 )             30.0     20 Mar 2021 22:26    138    |  107    |  11     ----------------------------<  127    4.0     |  20     |  0.72     Ca    7.4        20 Mar 2021 22:26  Phos  3.0       20 Mar 2021 22:26  Mg     1.8       20 Mar 2021 22:26      PT/INR - ( 20 Mar 2021 22:26 )   PT: 11.9 sec;   INR: 0.99 ratio         PTT - ( 20 Mar 2021 22:26 )  PTT:41.1 sec    ___________________________________________________  MEDICATIONS  (STANDING):  aspirin enteric coated 81 milliGRAM(s) Oral daily  chlorhexidine 2% Cloths 1 Application(s) Topical <User Schedule>  ciprofloxacin     Tablet 500 milliGRAM(s) Oral every 12 hours  enoxaparin Injectable 40 milliGRAM(s) SubCutaneous daily  polyethylene glycol 3350 17 Gram(s) Oral daily  senna 2 Tablet(s) Oral at bedtime    MEDICATIONS  (PRN):  acetaminophen   Tablet .. 650 milliGRAM(s) Oral every 6 hours PRN Mild Pain (1 - 3)  oxyCODONE    IR 5 milliGRAM(s) Oral every 4 hours PRN Severe Pain (7 - 10)

## 2021-03-21 NOTE — PROGRESS NOTE ADULT - ASSESSMENT
ASSESSMENT/PLAN:   MICHEL ONEIL is a 50yFemale s/p fall w/ amputation of the left ring finger now s/p L RF replant on 3/20. Recovering appropriately in SICU    - ASA 81 mg PO dailu  - Leech therapy to replanted digit  - Empiric ciprofloxacin while on leech therapy  - Neurovascular checks  - Pulse oximetry to ring finger not necessary, ensure good bleeding from nail plate   - Pain control  - Encourage OOB and ambulate  - Maintain hand in splint, and LUE elevation, bleeding to be expected  - Continue DVT prophylaxis      Angélica Alan MD PGY1  Plastic Surgery   [facility]: [id number]  [facility]: [phone]

## 2021-03-22 LAB
ANION GAP SERPL CALC-SCNC: 7 MMOL/L — SIGNIFICANT CHANGE UP (ref 5–17)
ANION GAP SERPL CALC-SCNC: 8 MMOL/L — SIGNIFICANT CHANGE UP (ref 5–17)
APTT BLD: 28.2 SEC — SIGNIFICANT CHANGE UP (ref 27.5–35.5)
BASE EXCESS BLDV CALC-SCNC: 1.7 MMOL/L — SIGNIFICANT CHANGE UP (ref -2–2)
BUN SERPL-MCNC: 6 MG/DL — LOW (ref 7–23)
BUN SERPL-MCNC: 7 MG/DL — SIGNIFICANT CHANGE UP (ref 7–23)
CA-I SERPL-SCNC: 1.12 MMOL/L — SIGNIFICANT CHANGE UP (ref 1.12–1.3)
CALCIUM SERPL-MCNC: 7.5 MG/DL — LOW (ref 8.4–10.5)
CALCIUM SERPL-MCNC: 8 MG/DL — LOW (ref 8.4–10.5)
CHLORIDE BLDV-SCNC: 109 MMOL/L — HIGH (ref 96–108)
CHLORIDE SERPL-SCNC: 108 MMOL/L — SIGNIFICANT CHANGE UP (ref 96–108)
CHLORIDE SERPL-SCNC: 110 MMOL/L — HIGH (ref 96–108)
CK MB CFR SERPL CALC: 1.6 NG/ML — SIGNIFICANT CHANGE UP (ref 0–3.8)
CK SERPL-CCNC: 52 U/L — SIGNIFICANT CHANGE UP (ref 25–170)
CO2 BLDV-SCNC: 28 MMOL/L — SIGNIFICANT CHANGE UP (ref 22–30)
CO2 SERPL-SCNC: 24 MMOL/L — SIGNIFICANT CHANGE UP (ref 22–31)
CO2 SERPL-SCNC: 25 MMOL/L — SIGNIFICANT CHANGE UP (ref 22–31)
CREAT SERPL-MCNC: 0.66 MG/DL — SIGNIFICANT CHANGE UP (ref 0.5–1.3)
CREAT SERPL-MCNC: 0.71 MG/DL — SIGNIFICANT CHANGE UP (ref 0.5–1.3)
GAS PNL BLDV: 138 MMOL/L — SIGNIFICANT CHANGE UP (ref 135–145)
GAS PNL BLDV: SIGNIFICANT CHANGE UP
GAS PNL BLDV: SIGNIFICANT CHANGE UP
GLUCOSE BLDV-MCNC: 150 MG/DL — HIGH (ref 70–99)
GLUCOSE SERPL-MCNC: 157 MG/DL — HIGH (ref 70–99)
GLUCOSE SERPL-MCNC: 96 MG/DL — SIGNIFICANT CHANGE UP (ref 70–99)
HCO3 BLDV-SCNC: 26 MMOL/L — SIGNIFICANT CHANGE UP (ref 21–29)
HCT VFR BLD CALC: 24.1 % — LOW (ref 34.5–45)
HCT VFR BLD CALC: 26.4 % — LOW (ref 34.5–45)
HCT VFR BLD CALC: 27.5 % — LOW (ref 34.5–45)
HCT VFR BLDA CALC: 25 % — LOW (ref 39–50)
HGB BLD CALC-MCNC: 8.1 G/DL — LOW (ref 11.5–15.5)
HGB BLD-MCNC: 8 G/DL — LOW (ref 11.5–15.5)
HGB BLD-MCNC: 8.6 G/DL — LOW (ref 11.5–15.5)
HGB BLD-MCNC: 8.9 G/DL — LOW (ref 11.5–15.5)
INR BLD: 0.95 RATIO — SIGNIFICANT CHANGE UP (ref 0.88–1.16)
LACTATE BLDV-MCNC: 1.2 MMOL/L — SIGNIFICANT CHANGE UP (ref 0.7–2)
MAGNESIUM SERPL-MCNC: 1.9 MG/DL — SIGNIFICANT CHANGE UP (ref 1.6–2.6)
MAGNESIUM SERPL-MCNC: 2 MG/DL — SIGNIFICANT CHANGE UP (ref 1.6–2.6)
MCHC RBC-ENTMCNC: 31.9 PG — SIGNIFICANT CHANGE UP (ref 27–34)
MCHC RBC-ENTMCNC: 32.4 GM/DL — SIGNIFICANT CHANGE UP (ref 32–36)
MCHC RBC-ENTMCNC: 32.6 GM/DL — SIGNIFICANT CHANGE UP (ref 32–36)
MCHC RBC-ENTMCNC: 32.8 PG — SIGNIFICANT CHANGE UP (ref 27–34)
MCHC RBC-ENTMCNC: 33.2 GM/DL — SIGNIFICANT CHANGE UP (ref 32–36)
MCHC RBC-ENTMCNC: 33.5 PG — SIGNIFICANT CHANGE UP (ref 27–34)
MCV RBC AUTO: 100.8 FL — HIGH (ref 80–100)
MCV RBC AUTO: 100.8 FL — HIGH (ref 80–100)
MCV RBC AUTO: 98.6 FL — SIGNIFICANT CHANGE UP (ref 80–100)
NRBC # BLD: 0 /100 WBCS — SIGNIFICANT CHANGE UP (ref 0–0)
OTHER CELLS CSF MANUAL: 11 ML/DL — LOW (ref 18–22)
PCO2 BLDV: 44 MMHG — SIGNIFICANT CHANGE UP (ref 35–50)
PH BLDV: 7.4 — SIGNIFICANT CHANGE UP (ref 7.35–7.45)
PHOSPHATE SERPL-MCNC: 2 MG/DL — LOW (ref 2.5–4.5)
PHOSPHATE SERPL-MCNC: 5.4 MG/DL — HIGH (ref 2.5–4.5)
PLATELET # BLD AUTO: 182 K/UL — SIGNIFICANT CHANGE UP (ref 150–400)
PLATELET # BLD AUTO: 182 K/UL — SIGNIFICANT CHANGE UP (ref 150–400)
PLATELET # BLD AUTO: 214 K/UL — SIGNIFICANT CHANGE UP (ref 150–400)
PO2 BLDV: 70 MMHG — HIGH (ref 25–45)
POTASSIUM BLDV-SCNC: 3.6 MMOL/L — SIGNIFICANT CHANGE UP (ref 3.5–5.3)
POTASSIUM SERPL-MCNC: 3.8 MMOL/L — SIGNIFICANT CHANGE UP (ref 3.5–5.3)
POTASSIUM SERPL-MCNC: 3.8 MMOL/L — SIGNIFICANT CHANGE UP (ref 3.5–5.3)
POTASSIUM SERPL-SCNC: 3.8 MMOL/L — SIGNIFICANT CHANGE UP (ref 3.5–5.3)
POTASSIUM SERPL-SCNC: 3.8 MMOL/L — SIGNIFICANT CHANGE UP (ref 3.5–5.3)
PROTHROM AB SERPL-ACNC: 11.4 SEC — SIGNIFICANT CHANGE UP (ref 10.6–13.6)
RBC # BLD: 2.39 M/UL — LOW (ref 3.8–5.2)
RBC # BLD: 2.62 M/UL — LOW (ref 3.8–5.2)
RBC # BLD: 2.79 M/UL — LOW (ref 3.8–5.2)
RBC # FLD: 13.3 % — SIGNIFICANT CHANGE UP (ref 10.3–14.5)
RBC # FLD: 13.3 % — SIGNIFICANT CHANGE UP (ref 10.3–14.5)
RBC # FLD: 13.9 % — SIGNIFICANT CHANGE UP (ref 10.3–14.5)
SAO2 % BLDV: 96 % — HIGH (ref 67–88)
SODIUM SERPL-SCNC: 141 MMOL/L — SIGNIFICANT CHANGE UP (ref 135–145)
SODIUM SERPL-SCNC: 141 MMOL/L — SIGNIFICANT CHANGE UP (ref 135–145)
TROPONIN T, HIGH SENSITIVITY RESULT: <6 NG/L — SIGNIFICANT CHANGE UP (ref 0–51)
WBC # BLD: 5.35 K/UL — SIGNIFICANT CHANGE UP (ref 3.8–10.5)
WBC # BLD: 7.09 K/UL — SIGNIFICANT CHANGE UP (ref 3.8–10.5)
WBC # BLD: 7.41 K/UL — SIGNIFICANT CHANGE UP (ref 3.8–10.5)
WBC # FLD AUTO: 5.35 K/UL — SIGNIFICANT CHANGE UP (ref 3.8–10.5)
WBC # FLD AUTO: 7.09 K/UL — SIGNIFICANT CHANGE UP (ref 3.8–10.5)
WBC # FLD AUTO: 7.41 K/UL — SIGNIFICANT CHANGE UP (ref 3.8–10.5)

## 2021-03-22 PROCEDURE — 99233 SBSQ HOSP IP/OBS HIGH 50: CPT

## 2021-03-22 RX ORDER — SODIUM CHLORIDE 9 MG/ML
1000 INJECTION, SOLUTION INTRAVENOUS ONCE
Refills: 0 | Status: COMPLETED | OUTPATIENT
Start: 2021-03-22 | End: 2021-03-22

## 2021-03-22 RX ORDER — LANOLIN ALCOHOL/MO/W.PET/CERES
5 CREAM (GRAM) TOPICAL ONCE
Refills: 0 | Status: COMPLETED | OUTPATIENT
Start: 2021-03-22 | End: 2021-03-22

## 2021-03-22 RX ORDER — POTASSIUM CHLORIDE 20 MEQ
20 PACKET (EA) ORAL ONCE
Refills: 0 | Status: COMPLETED | OUTPATIENT
Start: 2021-03-22 | End: 2021-03-22

## 2021-03-22 RX ORDER — MAGNESIUM SULFATE 500 MG/ML
2 VIAL (ML) INJECTION ONCE
Refills: 0 | Status: COMPLETED | OUTPATIENT
Start: 2021-03-22 | End: 2021-03-22

## 2021-03-22 RX ORDER — BUPROPION HYDROCHLORIDE 150 MG/1
150 TABLET, EXTENDED RELEASE ORAL DAILY
Refills: 0 | Status: DISCONTINUED | OUTPATIENT
Start: 2021-03-22 | End: 2021-03-29

## 2021-03-22 RX ORDER — SODIUM CHLORIDE 9 MG/ML
1000 INJECTION INTRAMUSCULAR; INTRAVENOUS; SUBCUTANEOUS ONCE
Refills: 0 | Status: COMPLETED | OUTPATIENT
Start: 2021-03-22 | End: 2021-03-22

## 2021-03-22 RX ADMIN — Medication 125 MILLIMOLE(S): at 07:01

## 2021-03-22 RX ADMIN — Medication 20 MILLIEQUIVALENT(S): at 09:34

## 2021-03-22 RX ADMIN — Medication 500 MILLIGRAM(S): at 05:47

## 2021-03-22 RX ADMIN — OXYCODONE HYDROCHLORIDE 5 MILLIGRAM(S): 5 TABLET ORAL at 11:00

## 2021-03-22 RX ADMIN — Medication 500 MILLIGRAM(S): at 17:24

## 2021-03-22 RX ADMIN — Medication 20 MILLIEQUIVALENT(S): at 05:47

## 2021-03-22 RX ADMIN — CHLORHEXIDINE GLUCONATE 1 APPLICATION(S): 213 SOLUTION TOPICAL at 05:48

## 2021-03-22 RX ADMIN — Medication 125 MILLIMOLE(S): at 05:48

## 2021-03-22 RX ADMIN — ENOXAPARIN SODIUM 40 MILLIGRAM(S): 100 INJECTION SUBCUTANEOUS at 12:57

## 2021-03-22 RX ADMIN — Medication 1 PATCH: at 19:00

## 2021-03-22 RX ADMIN — SODIUM CHLORIDE 2000 MILLILITER(S): 9 INJECTION, SOLUTION INTRAVENOUS at 23:30

## 2021-03-22 RX ADMIN — Medication 50 GRAM(S): at 09:34

## 2021-03-22 RX ADMIN — SODIUM CHLORIDE 4000 MILLILITER(S): 9 INJECTION INTRAMUSCULAR; INTRAVENOUS; SUBCUTANEOUS at 08:08

## 2021-03-22 RX ADMIN — OXYCODONE HYDROCHLORIDE 5 MILLIGRAM(S): 5 TABLET ORAL at 20:18

## 2021-03-22 RX ADMIN — OXYCODONE HYDROCHLORIDE 5 MILLIGRAM(S): 5 TABLET ORAL at 11:30

## 2021-03-22 RX ADMIN — OXYCODONE HYDROCHLORIDE 5 MILLIGRAM(S): 5 TABLET ORAL at 20:48

## 2021-03-22 RX ADMIN — Medication 1 PATCH: at 18:00

## 2021-03-22 RX ADMIN — Medication 81 MILLIGRAM(S): at 12:57

## 2021-03-22 RX ADMIN — BUPROPION HYDROCHLORIDE 150 MILLIGRAM(S): 150 TABLET, EXTENDED RELEASE ORAL at 14:16

## 2021-03-22 RX ADMIN — Medication 5 MILLIGRAM(S): at 01:03

## 2021-03-22 NOTE — PROGRESS NOTE ADULT - SUBJECTIVE AND OBJECTIVE BOX
HISTORY:  51 y/o female w/ a PMHx of depression who presented today to [facility] after slipping down the stairs. Patient attempted to grab the railing to break her fall, which resulted in amputation of the left ring finger. She was subsequently transferred to [facility] for reimplantation. Case was uneventful. Patient admitted to SICU post-operatively for leech therapy and frequent neurovascular checks. Patient currently denies fevers, headache, dizziness, weakness, shortness of breath, chest pain, abdominal pain, or nausea/vomiting.    24 HOUR EVENTS:  - Croft discontinued and passed trial of void  - HCT slowly downtrending from 31.8 -> 30 -> 26.4    HISTORY  50y Female    24 HOUR EVENTS:    SUBJECTIVE/ROS:  [x] A ten-point review of systems was otherwise negative except as noted.  [ ] Due to altered mental status/intubation, subjective information were not able to be obtained from the patient. History was obtained, to the extent possible, from review of the chart and collateral sources of information.    NEURO  Exam: awake, alert, oriented x4, no acute distress, no focal deficits  Meds:  - acetaminophen   Tablet .. 650 milliGRAM(s) Oral every 6 hours PRN Mild Pain (1 - 3)  - oxyCODONE    IR 5 milliGRAM(s) Oral every 4 hours PRN Severe Pain (7 - 10)  [x] Adequacy of sedation and pain control has been assessed and adjusted    RESPIRATORY  RR: 16 (03-21-21 @ 23:00) (12 - 37)  SpO2: 100% (03-21-21 @ 23:00) (98% - 100%)  Exam: clear to auscultation bilaterally  Mechanical Ventilation: no  [N/A] Extubation Readiness Assessed  Meds: none    CARDIOVASCULAR  HR: 63 (03-21-21 @ 23:00) (53 - 89)  BP: 101/66 (03-21-21 @ 23:00) (101/66 - 136/77)  BP(mean): 79 (03-21-21 @ 23:00) (77 - 100)  Exam: regular rate and rhythm, S1S2  Cardiac Rhythm: sinus  Perfusion    [x]Adequate    [ ]Inadequate  Mentation   [x]Normal       [ ]Reduced  Extremities  [x]Warm         [ ]Cool  Volume Status [ ]Hypervolemic [x]Euvolemic [ ]Hypovolemic  Meds: none    GI/NUTRITION  Exam: soft, nontender, nondistended  Diet: regular  Meds:  - polyethylene glycol 3350 17 Gram(s) Oral daily  - senna 2 Tablet(s) Oral at bedtime    GENITOURINARY  I&O's Detail  03-21 @ 07:01  -  03-22 @ 02:43  --------------------------------------------------------  IN:    Oral Fluid: 240 mL  Total IN: 240 mL    OUT:    Indwelling Catheter - Urethral (mL): 830 mL  Total OUT: 830 mL    Total NET: -590 mL    141  |  110<H>  |  7   ----------------------------<  96  3.8   |  24  |  0.71    Ca    8.0<L>      22 Mar 2021 00:59  Phos  2.0  Mg     2.0    [x] Croft catheter, indication: perioperative use for selected surgical procedures  Meds: none    HEMATOLOGIC  Meds:  - aspirin enteric coated 81 milliGRAM(s) Oral daily  - enoxaparin Injectable 40 milliGRAM(s) SubCutaneous daily  [x] VTE Prophylaxis                        8.6    5.35  )-----------( 182      ( 22 Mar 2021 00:59 )             26.4     PT/INR - ( 22 Mar 2021 00:59 )   PT: 11.4 sec;   INR: 0.95 ratio    PTT - ( 22 Mar 2021 00:59 )  PTT:28.2 sec    INFECTIOUS DISEASES  T(C): 36.5 (03-21-21 @ 23:00), Max: 36.7 (03-21-21 @ 15:00)  WBC Count:  - 5.35 K/uL (03-22 @ 00:59)  - 8.30 K/uL (03-21 @ 10:16)  Recent Cultures: none  Meds: ciprofloxacin     Tablet 500 milliGRAM(s) Oral every 12 hours    ENDOCRINE  Capillary Blood Glucose: none  Meds: none    ACCESS DEVICES:  [x] Peripheral IV  [ ] Central Venous Line	[ ] R	[ ] L	[ ] IJ	[ ] Fem	[ ] SC	Placed:   [ ] Arterial Line		[ ] R	[ ] L	[ ] Fem	[ ] Rad	[ ] Ax	Placed:   [ ] PICC:					[ ] Mediport  [ ] Urinary Catheter, Date Placed:  [x] Necessity of urinary, arterial, and venous catheters discussed    OTHER MEDICATIONS: chlorhexidine 2% Cloths 1 Application(s) Topical <User Schedule>    CODE STATUS: Full code    IMAGING:

## 2021-03-22 NOTE — PROGRESS NOTE ADULT - ASSESSMENT
49 y/o female w/ a PMHx of depression presenting s/p fall w/ amputation of the left ring finger s/p reimplantation requiring frequent neurovascular checks    PLAN:    Neuro: acute post-op pain, depression  - Monitor mental status  - Pain control as needed with acetaminophen and oxycodone  - Will restart home Wellbutrin when dose is confirmed    Resp: no acute issues  - Monitor pulse oximeter  - Out of bed to chair, ambulate as tolerated, and incentive spirometry to prevent atelectasis    CV: no acute issues  - Monitor vital signs    GI: no acute issues  - Regular diet as tolerated  - Bowel regimen with senna & Miralax    Renal: no acute issues  - Monitor I&Os  - Monitor electrolytes and replete as necessary    Heme: s/p finger reimplantation  - Monitor CBC and coags  - Lovenox for VTE prophylaxis  - ASA 81 mg PO daily as per plastics in the setting of her reimplanted finger    ID: leech therapy  - Monitor for clinical evidence of active infection  - Empiric ciprofloxacin while on leech therapy    Endo: no acute issues  - Monitor glucose on BMP    Code Status: Full code    Disposition: Will remain in SICU    Gabrielle Sullivan PA-C     [id number]

## 2021-03-22 NOTE — DIETITIAN INITIAL EVALUATION ADULT. - OTHER INFO
Pt seen for:  BMI list, 8 ICU Length Of Stay                                  GI issues: denies N/V           Last  BM: none since adm (miralax, senna)               Food Allergies/Intolerances:  NKFA               Vitamins/Supplements: vitamin D  Wt hx: pt reports usual wt 111-115 lb, ? dosing wt of 97 lb, pt states she has always been thin, no wt loss over past year                            Skin: no pressure injuries documented     Subjective/Objective: reports good appetite    Wt used for nutrition needs: usual wt 111 lb

## 2021-03-22 NOTE — DIETITIAN INITIAL EVALUATION ADULT. - PERTINENT MEDS FT
MEDICATIONS  (STANDING):  aspirin enteric coated 81 milliGRAM(s) Oral daily  chlorhexidine 2% Cloths 1 Application(s) Topical <User Schedule>  ciprofloxacin     Tablet 500 milliGRAM(s) Oral every 12 hours  enoxaparin Injectable 40 milliGRAM(s) SubCutaneous daily  magnesium sulfate  IVPB 2 Gram(s) IV Intermittent once  polyethylene glycol 3350 17 Gram(s) Oral daily  potassium chloride    Tablet ER 20 milliEquivalent(s) Oral once  senna 2 Tablet(s) Oral at bedtime

## 2021-03-22 NOTE — PROGRESS NOTE ADULT - ASSESSMENT
ASSESSMENT/PLAN:   MICHEL ONEIL is a 50yFemale s/p fall w/ amputation of the left ring finger now s/p L RF replant on 3/20. Recovering appropriately in SICU    - OT to apply dorsal extension block splint today  - ASA 81 mg PO daily  - Leech therapy to replanted digit  - Empiric ciprofloxacin while on leech therapy  - Neurovascular checks  - Pain control  - Encourage OOB and ambulate  - LUE elevation, bleeding to be expected  - Continue DVT prophylaxis      Angélica Alan MD PGY1  Plastic Surgery   [facility]: [id number]  [facility]: [phone]

## 2021-03-22 NOTE — PROVIDER CONTACT NOTE (CHANGE IN STATUS NOTIFICATION) - BACKGROUND
s/p left ring finger reimplantation, leech therapy in progress. Patient had syncopal episode this morning.

## 2021-03-22 NOTE — PROGRESS NOTE ADULT - SUBJECTIVE AND OBJECTIVE BOX
Plastic Surgery Progress Note (pg [facility]: [id number], [facility]: [phone])    SUBJECTIVE  The patient was seen and examined. No acute events overnight. Ongoing leech therapy to finger.     OBJECTIVE  ___________________________________________________  VITAL SIGNS / I&O's   Vital Signs Last 24 Hrs  T(C): 36.4 (22 Mar 2021 03:00), Max: 36.7 (21 Mar 2021 15:00)  T(F): 97.5 (22 Mar 2021 03:00), Max: 98.1 (21 Mar 2021 15:00)  HR: 67 (22 Mar 2021 03:00) (63 - 89)  BP: 108/81 (22 Mar 2021 03:00) (101/66 - 136/77)  BP(mean): 90 (22 Mar 2021 03:00) (77 - 100)  RR: 14 (22 Mar 2021 03:00) (14 - 37)  SpO2: 100% (22 Mar 2021 03:00) (98% - 100%)      21 Mar 2021 07:01  -  22 Mar 2021 07:00  --------------------------------------------------------  IN:    IV PiggyBack: 500 mL    Oral Fluid: 240 mL  Total IN: 740 mL    OUT:    Indwelling Catheter - Urethral (mL): 830 mL  Total OUT: 830 mL    Total NET: -90 mL        ___________________________________________________  PHYSICAL EXAM    -- CONSTITUTIONAL: NAD, lying in bed  -- NEURO: Awake, alert  -- PULM: Non-labored respirations  -- EXTREMITIES: L hand in splint, ring finger intact, good bleeding at nail plate, replanted digit does not appear congested    ___________________________________________________  [facility]                        8.6    5.35  )-----------( 182      ( 22 Mar 2021 00:59 )             26.4     22 Mar 2021 00:59    141    |  110    |  7      ----------------------------<  96     3.8     |  24     |  0.71     Ca    8.0        22 Mar 2021 00:59  Phos  2.0       22 Mar 2021 00:59  Mg     2.0       22 Mar 2021 00:59      PT/INR - ( 22 Mar 2021 00:59 )   PT: 11.4 sec;   INR: 0.95 ratio         PTT - ( 22 Mar 2021 00:59 )  PTT:28.2 sec    ___________________________________________________  MEDICATIONS  (STANDING):  aspirin enteric coated 81 milliGRAM(s) Oral daily  chlorhexidine 2% Cloths 1 Application(s) Topical <User Schedule>  ciprofloxacin     Tablet 500 milliGRAM(s) Oral every 12 hours  enoxaparin Injectable 40 milliGRAM(s) SubCutaneous daily  polyethylene glycol 3350 17 Gram(s) Oral daily  senna 2 Tablet(s) Oral at bedtime    MEDICATIONS  (PRN):  acetaminophen   Tablet .. 650 milliGRAM(s) Oral every 6 hours PRN Mild Pain (1 - 3)  oxyCODONE    IR 5 milliGRAM(s) Oral every 4 hours PRN Severe Pain (7 - 10)

## 2021-03-23 LAB
ANION GAP SERPL CALC-SCNC: 5 MMOL/L — SIGNIFICANT CHANGE UP (ref 5–17)
APTT BLD: 24.2 SEC — LOW (ref 27.5–35.5)
BLD GP AB SCN SERPL QL: NEGATIVE — SIGNIFICANT CHANGE UP
BUN SERPL-MCNC: 6 MG/DL — LOW (ref 7–23)
CALCIUM SERPL-MCNC: 8.3 MG/DL — LOW (ref 8.4–10.5)
CHLORIDE SERPL-SCNC: 109 MMOL/L — HIGH (ref 96–108)
CO2 SERPL-SCNC: 26 MMOL/L — SIGNIFICANT CHANGE UP (ref 22–31)
CREAT SERPL-MCNC: 0.61 MG/DL — SIGNIFICANT CHANGE UP (ref 0.5–1.3)
GLUCOSE SERPL-MCNC: 131 MG/DL — HIGH (ref 70–99)
HCT VFR BLD CALC: 20.5 % — CRITICAL LOW (ref 34.5–45)
HCT VFR BLD CALC: 22.7 % — LOW (ref 34.5–45)
HCT VFR BLD CALC: 23.3 % — LOW (ref 34.5–45)
HCT VFR BLD CALC: 25.4 % — LOW (ref 34.5–45)
HGB BLD-MCNC: 6.9 G/DL — CRITICAL LOW (ref 11.5–15.5)
HGB BLD-MCNC: 7.8 G/DL — LOW (ref 11.5–15.5)
HGB BLD-MCNC: 7.9 G/DL — LOW (ref 11.5–15.5)
HGB BLD-MCNC: 8.7 G/DL — LOW (ref 11.5–15.5)
INR BLD: 1.01 RATIO — SIGNIFICANT CHANGE UP (ref 0.88–1.16)
MAGNESIUM SERPL-MCNC: 1.8 MG/DL — SIGNIFICANT CHANGE UP (ref 1.6–2.6)
MCHC RBC-ENTMCNC: 31.6 PG — SIGNIFICANT CHANGE UP (ref 27–34)
MCHC RBC-ENTMCNC: 31.7 PG — SIGNIFICANT CHANGE UP (ref 27–34)
MCHC RBC-ENTMCNC: 32.6 PG — SIGNIFICANT CHANGE UP (ref 27–34)
MCHC RBC-ENTMCNC: 32.7 PG — SIGNIFICANT CHANGE UP (ref 27–34)
MCHC RBC-ENTMCNC: 33.5 GM/DL — SIGNIFICANT CHANGE UP (ref 32–36)
MCHC RBC-ENTMCNC: 33.7 GM/DL — SIGNIFICANT CHANGE UP (ref 32–36)
MCHC RBC-ENTMCNC: 34.3 GM/DL — SIGNIFICANT CHANGE UP (ref 32–36)
MCHC RBC-ENTMCNC: 34.8 GM/DL — SIGNIFICANT CHANGE UP (ref 32–36)
MCV RBC AUTO: 92.4 FL — SIGNIFICANT CHANGE UP (ref 80–100)
MCV RBC AUTO: 93.8 FL — SIGNIFICANT CHANGE UP (ref 80–100)
MCV RBC AUTO: 94.7 FL — SIGNIFICANT CHANGE UP (ref 80–100)
MCV RBC AUTO: 97.2 FL — SIGNIFICANT CHANGE UP (ref 80–100)
NRBC # BLD: 0 /100 WBCS — SIGNIFICANT CHANGE UP (ref 0–0)
PHOSPHATE SERPL-MCNC: 3 MG/DL — SIGNIFICANT CHANGE UP (ref 2.5–4.5)
PLATELET # BLD AUTO: 137 K/UL — LOW (ref 150–400)
PLATELET # BLD AUTO: 143 K/UL — LOW (ref 150–400)
PLATELET # BLD AUTO: 151 K/UL — SIGNIFICANT CHANGE UP (ref 150–400)
PLATELET # BLD AUTO: 161 K/UL — SIGNIFICANT CHANGE UP (ref 150–400)
POTASSIUM SERPL-MCNC: 4 MMOL/L — SIGNIFICANT CHANGE UP (ref 3.5–5.3)
POTASSIUM SERPL-SCNC: 4 MMOL/L — SIGNIFICANT CHANGE UP (ref 3.5–5.3)
PROTHROM AB SERPL-ACNC: 12.1 SEC — SIGNIFICANT CHANGE UP (ref 10.6–13.6)
RBC # BLD: 2.11 M/UL — LOW (ref 3.8–5.2)
RBC # BLD: 2.42 M/UL — LOW (ref 3.8–5.2)
RBC # BLD: 2.46 M/UL — LOW (ref 3.8–5.2)
RBC # BLD: 2.75 M/UL — LOW (ref 3.8–5.2)
RBC # FLD: 14.4 % — SIGNIFICANT CHANGE UP (ref 10.3–14.5)
RBC # FLD: 14.8 % — HIGH (ref 10.3–14.5)
RBC # FLD: 15 % — HIGH (ref 10.3–14.5)
RBC # FLD: 15.4 % — HIGH (ref 10.3–14.5)
RH IG SCN BLD-IMP: NEGATIVE — SIGNIFICANT CHANGE UP
SODIUM SERPL-SCNC: 140 MMOL/L — SIGNIFICANT CHANGE UP (ref 135–145)
WBC # BLD: 6.45 K/UL — SIGNIFICANT CHANGE UP (ref 3.8–10.5)
WBC # BLD: 6.88 K/UL — SIGNIFICANT CHANGE UP (ref 3.8–10.5)
WBC # BLD: 7.14 K/UL — SIGNIFICANT CHANGE UP (ref 3.8–10.5)
WBC # BLD: 8.08 K/UL — SIGNIFICANT CHANGE UP (ref 3.8–10.5)
WBC # FLD AUTO: 6.45 K/UL — SIGNIFICANT CHANGE UP (ref 3.8–10.5)
WBC # FLD AUTO: 6.88 K/UL — SIGNIFICANT CHANGE UP (ref 3.8–10.5)
WBC # FLD AUTO: 7.14 K/UL — SIGNIFICANT CHANGE UP (ref 3.8–10.5)
WBC # FLD AUTO: 8.08 K/UL — SIGNIFICANT CHANGE UP (ref 3.8–10.5)

## 2021-03-23 PROCEDURE — 99232 SBSQ HOSP IP/OBS MODERATE 35: CPT

## 2021-03-23 RX ORDER — LORATADINE 10 MG/1
10 TABLET ORAL ONCE
Refills: 0 | Status: COMPLETED | OUTPATIENT
Start: 2021-03-23 | End: 2021-03-23

## 2021-03-23 RX ORDER — LIDOCAINE HCL 20 MG/ML
20 VIAL (ML) INJECTION ONCE
Refills: 0 | Status: COMPLETED | OUTPATIENT
Start: 2021-03-23 | End: 2021-03-23

## 2021-03-23 RX ORDER — INFLUENZA VIRUS VACCINE 15; 15; 15; 15 UG/.5ML; UG/.5ML; UG/.5ML; UG/.5ML
0.5 SUSPENSION INTRAMUSCULAR ONCE
Refills: 0 | Status: DISCONTINUED | OUTPATIENT
Start: 2021-03-23 | End: 2021-03-29

## 2021-03-23 RX ORDER — MAGNESIUM SULFATE 500 MG/ML
2 VIAL (ML) INJECTION ONCE
Refills: 0 | Status: COMPLETED | OUTPATIENT
Start: 2021-03-23 | End: 2021-03-23

## 2021-03-23 RX ADMIN — OXYCODONE HYDROCHLORIDE 5 MILLIGRAM(S): 5 TABLET ORAL at 22:38

## 2021-03-23 RX ADMIN — Medication 500 MILLIGRAM(S): at 17:16

## 2021-03-23 RX ADMIN — Medication 20 MILLILITER(S): at 09:42

## 2021-03-23 RX ADMIN — BUPROPION HYDROCHLORIDE 150 MILLIGRAM(S): 150 TABLET, EXTENDED RELEASE ORAL at 11:30

## 2021-03-23 RX ADMIN — Medication 500 MILLIGRAM(S): at 06:36

## 2021-03-23 RX ADMIN — Medication 81 MILLIGRAM(S): at 11:30

## 2021-03-23 RX ADMIN — Medication 50 GRAM(S): at 05:36

## 2021-03-23 RX ADMIN — OXYCODONE HYDROCHLORIDE 5 MILLIGRAM(S): 5 TABLET ORAL at 22:08

## 2021-03-23 RX ADMIN — Medication 1 PATCH: at 08:00

## 2021-03-23 RX ADMIN — LORATADINE 10 MILLIGRAM(S): 10 TABLET ORAL at 19:55

## 2021-03-23 RX ADMIN — CHLORHEXIDINE GLUCONATE 1 APPLICATION(S): 213 SOLUTION TOPICAL at 05:35

## 2021-03-23 RX ADMIN — ENOXAPARIN SODIUM 40 MILLIGRAM(S): 100 INJECTION SUBCUTANEOUS at 11:29

## 2021-03-23 RX ADMIN — Medication 1 PATCH: at 19:53

## 2021-03-23 NOTE — PROGRESS NOTE ADULT - ASSESSMENT
51 y/o female w/ a PMHx of depression presenting s/p fall w/ amputation of the left ring finger s/p reimplantation requiring frequent neurovascular checks    PLAN:    Neuro: acute post-op pain, depression  - Monitor mental status  - Pain control as needed with acetaminophen and oxycodone  - Home Wellbutrin    Resp: no acute issues  - Monitor pulse oximeter  - Out of bed to chair, ambulate as tolerated, and incentive spirometry to prevent atelectasis    CV: no acute issues  - Monitor vital signs    GI: no acute issues  - Regular diet as tolerated  - Bowel regimen with senna & Miralax    Renal: no acute issues  - Monitor I&Os  - Monitor electrolytes and replete as necessary    Heme: s/p finger reimplantation  - Monitor CBC and coags  - Lovenox for VTE prophylaxis  - ASA 81 mg PO daily as per plastics in the setting of her reimplanted finger    ID: leech therapy  - Monitor for clinical evidence of active infection  - Empiric ciprofloxacin while on leech therapy    Endo: no acute issues  - Monitor glucose on BMP    Code Status: Full code    Disposition: Will remain in SICU

## 2021-03-23 NOTE — PROGRESS NOTE ADULT - SUBJECTIVE AND OBJECTIVE BOX
SURGICAL INTENSIVE CARE UNIT DAILY PROGRESS NOTE    24 HOUR EVENTS:  - Episode of vasovagal in AM, reponded to 1L, cardiac enzymes wnl   - Given 1u pRBC, responded 8/24 -> 8.9/27.5  - Vasovagal episode, rosa/hypotensive during blood draw, given 1L LR bolus     HISTORY:  49 y/o female w/ a PMHx of depression who presented today to [facility] after slipping down the stairs. Patient attempted to grab the railing to break her fall, which resulted in amputation of the left ring finger. She was subsequently transferred to [facility] for reimplantation. Case was uneventful. Patient admitted to SICU post-operatively for leech therapy and frequent neurovascular checks. Patient currently denies fevers, headache, dizziness, weakness, shortness of breath, chest pain, abdominal pain, or nausea/vomiting.    NEURO  Exam: awake, alert, oriented x4, no acute distress, no focal deficits  Meds: acetaminophen   Tablet .. 650 milliGRAM(s) Oral every 6 hours PRN Mild Pain (1 - 3)  buPROPion XL . 150 milliGRAM(s) Oral daily  oxyCODONE    IR 5 milliGRAM(s) Oral every 4 hours PRN Severe Pain (7 - 10)  [x] Adequacy of sedation and pain control has been assessed and adjusted    RESPIRATORY  RR: 14 (03-23-21 @ 02:00) (11 - 24)  SpO2: 100% (03-23-21 @ 02:00) (98% - 100%)  Exam: clear to auscultation bilaterally  Mechanical Ventilation: no  Meds: none    CARDIOVASCULAR  HR: 74 (03-23-21 @ 02:00) (67 - 105)  BP: 113/58 (03-23-21 @ 02:00) (103/53 - 146/74)  BP(mean): 79 (03-23-21 @ 02:00) (74 - 104)  VBG - ( 22 Mar 2021 08:09 )  pH: 7.40  /  pCO2: 44    /  pO2: 70    / HCO3: 26    / Base Excess: 1.7   /  SaO2: 96     Lactate: 1.2    Exam: regular rate and rhythm, S1S2  Cardiac Rhythm: sinus  Perfusion    [x]Adequate    [ ]Inadequate  Mentation   [x]Normal       [ ]Reduced  Extremities  [x]Warm         [ ]Cool  Volume Status [ ]Hypervolemic [x]Euvolemic [ ]Hypovolemic  Meds: none    GI/NUTRITION  Exam: soft, nontender, nondistended  Diet: regular  Meds: polyethylene glycol 3350 17 Gram(s) Oral daily  senna 2 Tablet(s) Oral at bedtime    GENITOURINARY  I&O's Detail    03-21 @ 07:01  -  03-22 @ 07:00  --------------------------------------------------------  IN:    IV PiggyBack: 500 mL    Oral Fluid: 240 mL  Total IN: 740 mL    OUT:    Indwelling Catheter - Urethral (mL): 830 mL  Total OUT: 830 mL    Total NET: -90 mL    03-22 @ 07:01  -  03-23 @ 02:53  --------------------------------------------------------  IN:    IV PiggyBack: 300 mL    Lactated Ringers Bolus: 1000 mL    Oral Fluid: 240 mL    PRBCs (Packed Red Blood Cells): 300 mL    Sodium Chloride 0.9% Bolus: 1000 mL  Total IN: 2840 mL    OUT:    Voided (mL): 450 mL  Total OUT: 450 mL    Total NET: 2390 mL    03-22    140  |  109<H>  |  6<L>  ----------------------------<  131<H>  4.0   |  26  |  0.61    Ca    8.3<L>      22 Mar 2021 23:36  Phos  3.0     03-22  Mg     1.8     03-22    [x] Croft catheter, indication: perioperative use for selected surgical procedures  Meds: none    HEMATOLOGIC  Meds: aspirin enteric coated 81 milliGRAM(s) Oral daily  enoxaparin Injectable 40 milliGRAM(s) SubCutaneous daily  [x] VTE Prophylaxis                        6.9    6.45  )-----------( 151      ( 22 Mar 2021 23:36 )             20.5     PT/INR - ( 22 Mar 2021 00:59 )   PT: 11.4 sec;   INR: 0.95 ratio    PTT - ( 22 Mar 2021 00:59 )  PTT:28.2 sec  Transfusion     [ ] PRBC   [ ] Platelets   [ ] FFP   [ ] Cryoprecipitate    INFECTIOUS DISEASES  T(C): 36.2 (03-22-21 @ 23:00), Max: 36.8 (03-22-21 @ 11:00)  Wt(kg): --  WBC Count: 6.45 K/uL (03-22 @ 23:36)  WBC Count: 7.09 K/uL (03-22 @ 17:30)  WBC Count: 7.41 K/uL (03-22 @ 08:16)  Recent Cultures: none  Meds: ciprofloxacin     Tablet 500 milliGRAM(s) Oral every 12 hours    ENDOCRINE  Capillary Blood Glucose  Meds: estradiol 0.05 mG/24Hr(s) Patch 1 Patch Transdermal <User Schedule    ACCESS DEVICES:  [x] Peripheral IV  [ ] Central Venous Line	[ ] R	[ ] L	[ ] IJ	[ ] Fem	[ ] SC	Placed:   [ ] Arterial Line		[ ] R	[ ] L	[ ] Fem	[ ] Rad	[ ] Ax	Placed:   [ ] PICC:					[ ] Mediport  [ ] Urinary Catheter, Date Placed:  [x] Necessity of urinary, arterial, and venous catheters discussed    OTHER MEDICATIONS:  chlorhexidine 2% Cloths 1 Application(s) Topical <User Schedule>    CODE STATUS: Full code    IMAGING:

## 2021-03-24 LAB
ANION GAP SERPL CALC-SCNC: 8 MMOL/L — SIGNIFICANT CHANGE UP (ref 5–17)
APTT BLD: 26.2 SEC — LOW (ref 27.5–35.5)
BUN SERPL-MCNC: 6 MG/DL — LOW (ref 7–23)
CALCIUM SERPL-MCNC: 7.8 MG/DL — LOW (ref 8.4–10.5)
CHLORIDE SERPL-SCNC: 106 MMOL/L — SIGNIFICANT CHANGE UP (ref 96–108)
CO2 SERPL-SCNC: 25 MMOL/L — SIGNIFICANT CHANGE UP (ref 22–31)
CREAT SERPL-MCNC: 0.68 MG/DL — SIGNIFICANT CHANGE UP (ref 0.5–1.3)
GLUCOSE SERPL-MCNC: 117 MG/DL — HIGH (ref 70–99)
HCT VFR BLD CALC: 21.5 % — LOW (ref 34.5–45)
HCT VFR BLD CALC: 25.2 % — LOW (ref 34.5–45)
HCT VFR BLD CALC: 26.7 % — LOW (ref 34.5–45)
HCT VFR BLD CALC: 27.4 % — LOW (ref 34.5–45)
HGB BLD-MCNC: 7.5 G/DL — LOW (ref 11.5–15.5)
HGB BLD-MCNC: 8.6 G/DL — LOW (ref 11.5–15.5)
HGB BLD-MCNC: 9.3 G/DL — LOW (ref 11.5–15.5)
HGB BLD-MCNC: 9.5 G/DL — LOW (ref 11.5–15.5)
INR BLD: 1.06 RATIO — SIGNIFICANT CHANGE UP (ref 0.88–1.16)
MAGNESIUM SERPL-MCNC: 1.7 MG/DL — SIGNIFICANT CHANGE UP (ref 1.6–2.6)
MCHC RBC-ENTMCNC: 31.6 PG — SIGNIFICANT CHANGE UP (ref 27–34)
MCHC RBC-ENTMCNC: 31.8 PG — SIGNIFICANT CHANGE UP (ref 27–34)
MCHC RBC-ENTMCNC: 32 PG — SIGNIFICANT CHANGE UP (ref 27–34)
MCHC RBC-ENTMCNC: 32.2 PG — SIGNIFICANT CHANGE UP (ref 27–34)
MCHC RBC-ENTMCNC: 34.1 GM/DL — SIGNIFICANT CHANGE UP (ref 32–36)
MCHC RBC-ENTMCNC: 34.7 GM/DL — SIGNIFICANT CHANGE UP (ref 32–36)
MCHC RBC-ENTMCNC: 34.8 GM/DL — SIGNIFICANT CHANGE UP (ref 32–36)
MCHC RBC-ENTMCNC: 34.9 GM/DL — SIGNIFICANT CHANGE UP (ref 32–36)
MCV RBC AUTO: 91.6 FL — SIGNIFICANT CHANGE UP (ref 80–100)
MCV RBC AUTO: 91.8 FL — SIGNIFICANT CHANGE UP (ref 80–100)
MCV RBC AUTO: 92.3 FL — SIGNIFICANT CHANGE UP (ref 80–100)
MCV RBC AUTO: 92.6 FL — SIGNIFICANT CHANGE UP (ref 80–100)
NRBC # BLD: 0 /100 WBCS — SIGNIFICANT CHANGE UP (ref 0–0)
PHOSPHATE SERPL-MCNC: 3.3 MG/DL — SIGNIFICANT CHANGE UP (ref 2.5–4.5)
PLATELET # BLD AUTO: 124 K/UL — LOW (ref 150–400)
PLATELET # BLD AUTO: 133 K/UL — LOW (ref 150–400)
PLATELET # BLD AUTO: 156 K/UL — SIGNIFICANT CHANGE UP (ref 150–400)
PLATELET # BLD AUTO: 158 K/UL — SIGNIFICANT CHANGE UP (ref 150–400)
POTASSIUM SERPL-MCNC: 4 MMOL/L — SIGNIFICANT CHANGE UP (ref 3.5–5.3)
POTASSIUM SERPL-SCNC: 4 MMOL/L — SIGNIFICANT CHANGE UP (ref 3.5–5.3)
PROTHROM AB SERPL-ACNC: 12.7 SEC — SIGNIFICANT CHANGE UP (ref 10.6–13.6)
RBC # BLD: 2.33 M/UL — LOW (ref 3.8–5.2)
RBC # BLD: 2.72 M/UL — LOW (ref 3.8–5.2)
RBC # BLD: 2.91 M/UL — LOW (ref 3.8–5.2)
RBC # BLD: 2.99 M/UL — LOW (ref 3.8–5.2)
RBC # FLD: 14.8 % — HIGH (ref 10.3–14.5)
RBC # FLD: 15.4 % — HIGH (ref 10.3–14.5)
RBC # FLD: 15.7 % — HIGH (ref 10.3–14.5)
RBC # FLD: 15.8 % — HIGH (ref 10.3–14.5)
SODIUM SERPL-SCNC: 139 MMOL/L — SIGNIFICANT CHANGE UP (ref 135–145)
WBC # BLD: 7.65 K/UL — SIGNIFICANT CHANGE UP (ref 3.8–10.5)
WBC # BLD: 8.15 K/UL — SIGNIFICANT CHANGE UP (ref 3.8–10.5)
WBC # BLD: 8.41 K/UL — SIGNIFICANT CHANGE UP (ref 3.8–10.5)
WBC # BLD: 9.02 K/UL — SIGNIFICANT CHANGE UP (ref 3.8–10.5)
WBC # FLD AUTO: 7.65 K/UL — SIGNIFICANT CHANGE UP (ref 3.8–10.5)
WBC # FLD AUTO: 8.15 K/UL — SIGNIFICANT CHANGE UP (ref 3.8–10.5)
WBC # FLD AUTO: 8.41 K/UL — SIGNIFICANT CHANGE UP (ref 3.8–10.5)
WBC # FLD AUTO: 9.02 K/UL — SIGNIFICANT CHANGE UP (ref 3.8–10.5)

## 2021-03-24 PROCEDURE — 99232 SBSQ HOSP IP/OBS MODERATE 35: CPT

## 2021-03-24 RX ORDER — METOPROLOL TARTRATE 50 MG
25 TABLET ORAL ONCE
Refills: 0 | Status: COMPLETED | OUTPATIENT
Start: 2021-03-24 | End: 2021-03-24

## 2021-03-24 RX ORDER — METOPROLOL TARTRATE 50 MG
25 TABLET ORAL EVERY 12 HOURS
Refills: 0 | Status: DISCONTINUED | OUTPATIENT
Start: 2021-03-24 | End: 2021-03-29

## 2021-03-24 RX ORDER — MAGNESIUM SULFATE 500 MG/ML
2 VIAL (ML) INJECTION ONCE
Refills: 0 | Status: COMPLETED | OUTPATIENT
Start: 2021-03-24 | End: 2021-03-24

## 2021-03-24 RX ORDER — OXYCODONE HYDROCHLORIDE 5 MG/1
5 TABLET ORAL EVERY 4 HOURS
Refills: 0 | Status: DISCONTINUED | OUTPATIENT
Start: 2021-03-24 | End: 2021-03-29

## 2021-03-24 RX ADMIN — Medication 50 GRAM(S): at 04:42

## 2021-03-24 RX ADMIN — Medication 500 MILLIGRAM(S): at 05:52

## 2021-03-24 RX ADMIN — Medication 1 PATCH: at 08:30

## 2021-03-24 RX ADMIN — CHLORHEXIDINE GLUCONATE 1 APPLICATION(S): 213 SOLUTION TOPICAL at 05:52

## 2021-03-24 RX ADMIN — Medication 1 PATCH: at 19:26

## 2021-03-24 RX ADMIN — POLYETHYLENE GLYCOL 3350 17 GRAM(S): 17 POWDER, FOR SOLUTION ORAL at 11:05

## 2021-03-24 RX ADMIN — ENOXAPARIN SODIUM 40 MILLIGRAM(S): 100 INJECTION SUBCUTANEOUS at 11:06

## 2021-03-24 RX ADMIN — Medication 25 MILLIGRAM(S): at 09:59

## 2021-03-24 RX ADMIN — Medication 81 MILLIGRAM(S): at 11:06

## 2021-03-24 RX ADMIN — BUPROPION HYDROCHLORIDE 150 MILLIGRAM(S): 150 TABLET, EXTENDED RELEASE ORAL at 12:39

## 2021-03-24 RX ADMIN — Medication 500 MILLIGRAM(S): at 18:27

## 2021-03-24 NOTE — PROGRESS NOTE ADULT - SUBJECTIVE AND OBJECTIVE BOX
Plastic Surgery Progress Note (pg [facility]: [id number], [facility]: [phone])    SUBJECTIVE  The patient was seen and examined. Received 2u pRBC over last 24 hours. Remains in bed.     OBJECTIVE  ___________________________________________________  VITAL SIGNS / I&O's   Vital Signs Last 24 Hrs  T(C): 36.5 (24 Mar 2021 03:00), Max: 37 (23 Mar 2021 23:00)  T(F): 97.7 (24 Mar 2021 03:00), Max: 98.6 (23 Mar 2021 23:00)  HR: 89 (24 Mar 2021 07:00) (73 - 111)  BP: 156/77 (24 Mar 2021 07:00) (107/76 - 173/83)  BP(mean): 110 (24 Mar 2021 07:00) (79 - 117)  RR: 24 (24 Mar 2021 07:00) (14 - 29)  SpO2: 100% (24 Mar 2021 07:00) (94% - 100%)      23 Mar 2021 07:01  -  24 Mar 2021 07:00  --------------------------------------------------------  IN:    IV PiggyBack: 50 mL    Oral Fluid: 480 mL    PRBCs (Packed Red Blood Cells): 620 mL  Total IN: 1150 mL    OUT:    Voided (mL): 1600 mL  Total OUT: 1600 mL    Total NET: -450 mL        ___________________________________________________  PHYSICAL EXAM    -- CONSTITUTIONAL: NAD, lying in bed  -- NEURO: Awake, alert  -- PULM: Non-labored respirations  -- EXTREMITIES: L hand in splint, ring finger intact, good bleeding at nail plate, replanted digit does not appear congested, leeches continue to latch well  ___________________________________________________  LABS                        9.3    7.65  )-----------( 124      ( 24 Mar 2021 06:18 )             26.7     24 Mar 2021 00:17    139    |  106    |  6      ----------------------------<  117    4.0     |  25     |  0.68     Ca    7.8        24 Mar 2021 00:17  Phos  3.3       24 Mar 2021 00:17  Mg     1.7       24 Mar 2021 00:17      PT/INR - ( 24 Mar 2021 00:17 )   PT: 12.7 sec;   INR: 1.06 ratio         PTT - ( 24 Mar 2021 00:17 )  PTT:26.2 sec  CAPILLARY BLOOD GLUCOSE        CARDIAC MARKERS ( 22 Mar 2021 08:16 )  x     / x     / 52 U/L / x     / 1.6 ng/mL    ___________________________________________________  MEDICATIONS  (STANDING):  aspirin enteric coated 81 milliGRAM(s) Oral daily  buPROPion XL . 150 milliGRAM(s) Oral daily  chlorhexidine 2% Cloths 1 Application(s) Topical <User Schedule>  ciprofloxacin     Tablet 500 milliGRAM(s) Oral every 12 hours  enoxaparin Injectable 40 milliGRAM(s) SubCutaneous daily  estradiol 0.05 mG/24Hr(s) Patch 1 Patch Transdermal <User Schedule>  influenza   Vaccine 0.5 milliLiter(s) IntraMuscular once  polyethylene glycol 3350 17 Gram(s) Oral daily  senna 2 Tablet(s) Oral at bedtime    MEDICATIONS  (PRN):  acetaminophen   Tablet .. 650 milliGRAM(s) Oral every 6 hours PRN Mild Pain (1 - 3)  oxyCODONE    IR 5 milliGRAM(s) Oral every 4 hours PRN Severe Pain (7 - 10)

## 2021-03-24 NOTE — PROGRESS NOTE ADULT - ASSESSMENT
ASSESSMENT/PLAN:   MICHEL ONEIL is a 50yFemale s/p fall w/ amputation of the left ring finger now s/p L RF replant on 3/20. Recovering appropriately in SICU    - repeat CBCs, transfuse as needed  - Keep hand in dorsal extension block splint  - Ok to change underlying marge and ABDs as needed  - ASA 81 mg PO daily  - Continue leech therapy to replanted digit  - Empiric ciprofloxacin while on leech therapy  - Neurovascular checks  - Pain control  - LUE elevation, bleeding to be expected  - Continue DVT prophylaxis      Angélica Alan MD PGY1  Plastic Surgery   [facility]: [id number]  [facility]: [phone]

## 2021-03-24 NOTE — PROGRESS NOTE ADULT - ASSESSMENT
49 y/o female w/ a PMHx of depression presenting s/p fall w/ amputation of the left ring finger s/p reimplantation requiring frequent neurovascular checks    PLAN:    Neuro: acute post-op pain, depression  - Monitor mental status  - Pain control as needed with acetaminophen and oxycodone  - Home Wellbutrin    Resp: no acute issues  - Monitor pulse oximeter  - Out of bed to chair, ambulate as tolerated, and incentive spirometry to prevent atelectasis    CV: no acute issues  - Monitor hemodynamics  - q2hr leech therapy for reimplanted finger     GI: no acute issues  - Regular diet as tolerated  - Bowel regimen with senna & Miralax    Renal: no acute issues  - Monitor I&Os  - Monitor electrolytes and replete as necessary    Heme: s/p finger reimplantation  - Trend H/H q6hr, transfuse PRN for Hb < 8  - Lovenox for VTE prophylaxis  - ASA 81 mg PO daily     ID: leech therapy  - Monitor for clinical evidence of active infection  - Empiric ciprofloxacin while on leech therapy    Endo: no acute issues  - Monitor glucose on BMP    Code Status: Full code    Disposition:  SICU

## 2021-03-24 NOTE — PROGRESS NOTE ADULT - SUBJECTIVE AND OBJECTIVE BOX
SURGICAL INTENSIVE CARE UNIT DAILY PROGRESS NOTE    24 HOUR EVENTS:   - 1 unit pRBC given during day, additional 1u pRBC o/n for downtrended H/H  - Remains w leech therapy q2hr     HPI: 49 y/o female w/ a PMHx of depression who presented today to [facility] after slipping down the stairs. Patient attempted to grab the railing to break her fall, which resulted in amputation of the left ring finger. She was subsequently transferred to [facility] for reimplantation. Case was uneventful. Patient admitted to SICU post-operatively for leech therapy and frequent neurovascular checks. Patient currently denies fevers, headache, dizziness, weakness, shortness of breath, chest pain, abdominal pain, or nausea/vomiting.      NEURO  - Awake, alert, oriented    RESPIRATORY  RR: 20 (03-24-21 @ 04:00) (14 - 29)  SpO2: 99% (03-24-21 @ 04:00) (94% - 100%)  - No increased work of breathing     CARDIOVASCULAR  HR: 100 (03-24-21 @ 04:00) (60 - 111)  BP: 138/63 (03-24-21 @ 04:00) (104/60 - 173/83)  BP(mean): 90 (03-24-21 @ 04:00) (76 - 117)  VBG - ( 22 Mar 2021 08:09 )  pH: 7.40  /  pCO2: 44    /  pO2: 70    / HCO3: 26    / Base Excess: 1.7   /  SaO2: 96     Lactate: 1.2    - hemodynamically stable     GI/NUTRITION  Diet: regular     GENITOURINARY  I&O's Detail    03-22 @ 07:01  -  03-23 @ 07:00  --------------------------------------------------------  IN:    IV PiggyBack: 350 mL    Lactated Ringers Bolus: 1000 mL    Oral Fluid: 240 mL    PRBCs (Packed Red Blood Cells): 300 mL    Sodium Chloride 0.9% Bolus: 1000 mL  Total IN: 2890 mL    OUT:    Voided (mL): 1250 mL  Total OUT: 1250 mL    Total NET: 1640 mL      03-23 @ 07:01  -  03-24 @ 04:39  --------------------------------------------------------  IN:    Oral Fluid: 480 mL    PRBCs (Packed Red Blood Cells): 620 mL  Total IN: 1100 mL    OUT:    Voided (mL): 1600 mL  Total OUT: 1600 mL    Total NET: -500 mL          03-24    139  |  106  |  6<L>  ----------------------------<  117<H>  4.0   |  25  |  0.68    Ca    7.8<L>      24 Mar 2021 00:17  Phos  3.3     03-24  Mg     1.7     03-24        HEMATOLOGIC                        7.5    8.15  )-----------( 133      ( 24 Mar 2021 00:17 )             21.5     PT/INR - ( 24 Mar 2021 00:17 )   PT: 12.7 sec;   INR: 1.06 ratio         PTT - ( 24 Mar 2021 00:17 )  PTT:26.2 sec

## 2021-03-25 LAB
ANION GAP SERPL CALC-SCNC: 8 MMOL/L — SIGNIFICANT CHANGE UP (ref 5–17)
ANION GAP SERPL CALC-SCNC: 9 MMOL/L — SIGNIFICANT CHANGE UP (ref 5–17)
APTT BLD: 26.7 SEC — LOW (ref 27.5–35.5)
BUN SERPL-MCNC: 12 MG/DL — SIGNIFICANT CHANGE UP (ref 7–23)
BUN SERPL-MCNC: 8 MG/DL — SIGNIFICANT CHANGE UP (ref 7–23)
CALCIUM SERPL-MCNC: 7.9 MG/DL — LOW (ref 8.4–10.5)
CALCIUM SERPL-MCNC: 8.4 MG/DL — SIGNIFICANT CHANGE UP (ref 8.4–10.5)
CHLORIDE SERPL-SCNC: 107 MMOL/L — SIGNIFICANT CHANGE UP (ref 96–108)
CHLORIDE SERPL-SCNC: 108 MMOL/L — SIGNIFICANT CHANGE UP (ref 96–108)
CO2 SERPL-SCNC: 23 MMOL/L — SIGNIFICANT CHANGE UP (ref 22–31)
CO2 SERPL-SCNC: 23 MMOL/L — SIGNIFICANT CHANGE UP (ref 22–31)
CREAT SERPL-MCNC: 0.71 MG/DL — SIGNIFICANT CHANGE UP (ref 0.5–1.3)
CREAT SERPL-MCNC: 0.74 MG/DL — SIGNIFICANT CHANGE UP (ref 0.5–1.3)
GLUCOSE SERPL-MCNC: 102 MG/DL — HIGH (ref 70–99)
GLUCOSE SERPL-MCNC: 104 MG/DL — HIGH (ref 70–99)
HCT VFR BLD CALC: 21.3 % — LOW (ref 34.5–45)
HCT VFR BLD CALC: 23.3 % — LOW (ref 34.5–45)
HCT VFR BLD CALC: 24.4 % — LOW (ref 34.5–45)
HCT VFR BLD CALC: 25.7 % — LOW (ref 34.5–45)
HGB BLD-MCNC: 7.3 G/DL — LOW (ref 11.5–15.5)
HGB BLD-MCNC: 7.8 G/DL — LOW (ref 11.5–15.5)
HGB BLD-MCNC: 8.2 G/DL — LOW (ref 11.5–15.5)
HGB BLD-MCNC: 8.9 G/DL — LOW (ref 11.5–15.5)
INR BLD: 1.01 RATIO — SIGNIFICANT CHANGE UP (ref 0.88–1.16)
MAGNESIUM SERPL-MCNC: 1.8 MG/DL — SIGNIFICANT CHANGE UP (ref 1.6–2.6)
MAGNESIUM SERPL-MCNC: 1.9 MG/DL — SIGNIFICANT CHANGE UP (ref 1.6–2.6)
MCHC RBC-ENTMCNC: 30.8 PG — SIGNIFICANT CHANGE UP (ref 27–34)
MCHC RBC-ENTMCNC: 31 PG — SIGNIFICANT CHANGE UP (ref 27–34)
MCHC RBC-ENTMCNC: 31.1 PG — SIGNIFICANT CHANGE UP (ref 27–34)
MCHC RBC-ENTMCNC: 31.7 PG — SIGNIFICANT CHANGE UP (ref 27–34)
MCHC RBC-ENTMCNC: 33.5 GM/DL — SIGNIFICANT CHANGE UP (ref 32–36)
MCHC RBC-ENTMCNC: 33.6 GM/DL — SIGNIFICANT CHANGE UP (ref 32–36)
MCHC RBC-ENTMCNC: 34.3 GM/DL — SIGNIFICANT CHANGE UP (ref 32–36)
MCHC RBC-ENTMCNC: 34.6 GM/DL — SIGNIFICANT CHANGE UP (ref 32–36)
MCV RBC AUTO: 89.5 FL — SIGNIFICANT CHANGE UP (ref 80–100)
MCV RBC AUTO: 91.7 FL — SIGNIFICANT CHANGE UP (ref 80–100)
MCV RBC AUTO: 92.6 FL — SIGNIFICANT CHANGE UP (ref 80–100)
MCV RBC AUTO: 92.8 FL — SIGNIFICANT CHANGE UP (ref 80–100)
NRBC # BLD: 0 /100 WBCS — SIGNIFICANT CHANGE UP (ref 0–0)
PHOSPHATE SERPL-MCNC: 3.5 MG/DL — SIGNIFICANT CHANGE UP (ref 2.5–4.5)
PHOSPHATE SERPL-MCNC: 4 MG/DL — SIGNIFICANT CHANGE UP (ref 2.5–4.5)
PLATELET # BLD AUTO: 151 K/UL — SIGNIFICANT CHANGE UP (ref 150–400)
PLATELET # BLD AUTO: 152 K/UL — SIGNIFICANT CHANGE UP (ref 150–400)
PLATELET # BLD AUTO: 167 K/UL — SIGNIFICANT CHANGE UP (ref 150–400)
PLATELET # BLD AUTO: 178 K/UL — SIGNIFICANT CHANGE UP (ref 150–400)
POTASSIUM SERPL-MCNC: 3.9 MMOL/L — SIGNIFICANT CHANGE UP (ref 3.5–5.3)
POTASSIUM SERPL-MCNC: 3.9 MMOL/L — SIGNIFICANT CHANGE UP (ref 3.5–5.3)
POTASSIUM SERPL-SCNC: 3.9 MMOL/L — SIGNIFICANT CHANGE UP (ref 3.5–5.3)
POTASSIUM SERPL-SCNC: 3.9 MMOL/L — SIGNIFICANT CHANGE UP (ref 3.5–5.3)
PROTHROM AB SERPL-ACNC: 12.1 SEC — SIGNIFICANT CHANGE UP (ref 10.6–13.6)
RBC # BLD: 2.3 M/UL — LOW (ref 3.8–5.2)
RBC # BLD: 2.51 M/UL — LOW (ref 3.8–5.2)
RBC # BLD: 2.66 M/UL — LOW (ref 3.8–5.2)
RBC # BLD: 2.87 M/UL — LOW (ref 3.8–5.2)
RBC # FLD: 15.6 % — HIGH (ref 10.3–14.5)
RBC # FLD: 15.7 % — HIGH (ref 10.3–14.5)
RBC # FLD: 16 % — HIGH (ref 10.3–14.5)
RBC # FLD: 16.2 % — HIGH (ref 10.3–14.5)
SODIUM SERPL-SCNC: 138 MMOL/L — SIGNIFICANT CHANGE UP (ref 135–145)
SODIUM SERPL-SCNC: 140 MMOL/L — SIGNIFICANT CHANGE UP (ref 135–145)
WBC # BLD: 7.32 K/UL — SIGNIFICANT CHANGE UP (ref 3.8–10.5)
WBC # BLD: 7.37 K/UL — SIGNIFICANT CHANGE UP (ref 3.8–10.5)
WBC # BLD: 7.41 K/UL — SIGNIFICANT CHANGE UP (ref 3.8–10.5)
WBC # BLD: 7.84 K/UL — SIGNIFICANT CHANGE UP (ref 3.8–10.5)
WBC # FLD AUTO: 7.32 K/UL — SIGNIFICANT CHANGE UP (ref 3.8–10.5)
WBC # FLD AUTO: 7.37 K/UL — SIGNIFICANT CHANGE UP (ref 3.8–10.5)
WBC # FLD AUTO: 7.41 K/UL — SIGNIFICANT CHANGE UP (ref 3.8–10.5)
WBC # FLD AUTO: 7.84 K/UL — SIGNIFICANT CHANGE UP (ref 3.8–10.5)

## 2021-03-25 PROCEDURE — 99232 SBSQ HOSP IP/OBS MODERATE 35: CPT

## 2021-03-25 RX ORDER — MAGNESIUM SULFATE 500 MG/ML
2 VIAL (ML) INJECTION ONCE
Refills: 0 | Status: COMPLETED | OUTPATIENT
Start: 2021-03-25 | End: 2021-03-25

## 2021-03-25 RX ORDER — POTASSIUM CHLORIDE 20 MEQ
20 PACKET (EA) ORAL ONCE
Refills: 0 | Status: COMPLETED | OUTPATIENT
Start: 2021-03-25 | End: 2021-03-26

## 2021-03-25 RX ORDER — MAGNESIUM SULFATE 500 MG/ML
2 VIAL (ML) INJECTION ONCE
Refills: 0 | Status: COMPLETED | OUTPATIENT
Start: 2021-03-25 | End: 2021-03-26

## 2021-03-25 RX ORDER — POTASSIUM CHLORIDE 20 MEQ
20 PACKET (EA) ORAL ONCE
Refills: 0 | Status: COMPLETED | OUTPATIENT
Start: 2021-03-25 | End: 2021-03-25

## 2021-03-25 RX ADMIN — ENOXAPARIN SODIUM 40 MILLIGRAM(S): 100 INJECTION SUBCUTANEOUS at 11:09

## 2021-03-25 RX ADMIN — BUPROPION HYDROCHLORIDE 150 MILLIGRAM(S): 150 TABLET, EXTENDED RELEASE ORAL at 12:58

## 2021-03-25 RX ADMIN — Medication 50 GRAM(S): at 06:10

## 2021-03-25 RX ADMIN — POLYETHYLENE GLYCOL 3350 17 GRAM(S): 17 POWDER, FOR SOLUTION ORAL at 11:09

## 2021-03-25 RX ADMIN — Medication 20 MILLIEQUIVALENT(S): at 06:10

## 2021-03-25 RX ADMIN — Medication 500 MILLIGRAM(S): at 06:11

## 2021-03-25 RX ADMIN — SENNA PLUS 2 TABLET(S): 8.6 TABLET ORAL at 22:18

## 2021-03-25 RX ADMIN — Medication 81 MILLIGRAM(S): at 11:09

## 2021-03-25 RX ADMIN — Medication 25 MILLIGRAM(S): at 18:07

## 2021-03-25 RX ADMIN — CHLORHEXIDINE GLUCONATE 1 APPLICATION(S): 213 SOLUTION TOPICAL at 06:11

## 2021-03-25 RX ADMIN — Medication 1 PATCH: at 19:22

## 2021-03-25 RX ADMIN — Medication 1 PATCH: at 07:30

## 2021-03-25 RX ADMIN — Medication 1 PATCH: at 13:03

## 2021-03-25 RX ADMIN — Medication 500 MILLIGRAM(S): at 18:08

## 2021-03-25 NOTE — PROGRESS NOTE ADULT - ASSESSMENT
ASSESSMENT/PLAN:   MICHEL ONEIL is a 50yFemale s/p fall w/ amputation of the left ring finger now s/p L RF replant on 3/20. Recovering appropriately in SICU    - repeat CBCs, transfuse as needed  - Keep hand in dorsal extension block splint  - Ok to change underlying marge and ABDs as needed  - ASA 81 mg PO daily  - Continue leech therapy to replanted digit, will switch to q4 leeches tomorrow morning  - Empiric ciprofloxacin while on leech therapy  - Neurovascular checks  - Pain control  - LUE elevation, bleeding to be expected  - Continue DVT prophylaxis      Angélica Alan MD PGY1  Plastic Surgery   [facility]: [id number]  [facility]: [phone]

## 2021-03-25 NOTE — PROGRESS NOTE ADULT - SUBJECTIVE AND OBJECTIVE BOX
SURGICAL INTENSIVE CARE UNIT DAILY PROGRESS NOTE    24 HOUR EVENTS:   - 1 unit pRBC given o/n for downtrended H/H   - Remains w leech therapy q2hr   - Added Metoprolol 25 mg BID    HPI: 51 y/o female w/ a PMHx of depression who presented today to [facility] after slipping down the stairs. Patient attempted to grab the railing to break her fall, which resulted in amputation of the left ring finger. She was subsequently transferred to [facility] for reimplantation. Case was uneventful. Patient admitted to SICU post-operatively for leech therapy and frequent neurovascular checks. Patient currently denies fevers, headache, dizziness, weakness, shortness of breath, chest pain, abdominal pain, or nausea/vomiting.    NEURO  - Awake, alert, oriented    RESPIRATORY  RR: 18 (03-25-21 @ 01:00) (15 - 35)  SpO2: 100% (03-25-21 @ 01:00) (87% - 100%)  - No increased work of breathing     CARDIOVASCULAR  HR: 76 (03-25-21 @ 01:00) (67 - 101)  BP: 127/61 (03-25-21 @ 01:00) (99/49 - 184/89)  BP(mean): 88 (03-25-21 @ 01:00) (71 - 127)  - hemodynamically stable     GI/NUTRITION  - Diet: regular     GENITOURINARY  I&O's Detail    03-23 @ 07:01  -  03-24 @ 07:00  --------------------------------------------------------  IN:    IV PiggyBack: 50 mL    Oral Fluid: 480 mL    PRBCs (Packed Red Blood Cells): 620 mL  Total IN: 1150 mL    OUT:    Voided (mL): 1600 mL  Total OUT: 1600 mL    Total NET: -450 mL      03-24 @ 07:01  -  03-25 @ 02:50  --------------------------------------------------------  IN:    Oral Fluid: 450 mL  Total IN: 450 mL    OUT:    Voided (mL): 600 mL  Total OUT: 600 mL    Total NET: -150 mL          03-24    139  |  106  |  6<L>  ----------------------------<  117<H>  4.0   |  25  |  0.68    Ca    7.8<L>      24 Mar 2021 00:17  Phos  3.3     03-24  Mg     1.7     03-24        HEMATOLOGIC                        7.3    7.32  )-----------( 151      ( 25 Mar 2021 02:26 )             21.3     PT/INR - ( 24 Mar 2021 00:17 )   PT: 12.7 sec;   INR: 1.06 ratio         PTT - ( 24 Mar 2021 00:17 )  PTT:26.2 sec    INFECTIOUS DISEASES  RECENT CULTURES:      ENDOCRINE  CAPILLARY BLOOD GLUCOSE          IMAGING:

## 2021-03-25 NOTE — PROGRESS NOTE ADULT - SUBJECTIVE AND OBJECTIVE BOX
Plastic Surgery Progress Note (pg [facility]: [id number], [facility]: [phone])    SUBJECTIVE  The patient was seen and examined. No acute events overnight. Received 1u pRBC yesterday.     OBJECTIVE  ___________________________________________________  VITAL SIGNS / I&O's   Vital Signs Last 24 Hrs  T(C): 36.4 (25 Mar 2021 07:00), Max: 36.7 (24 Mar 2021 19:00)  T(F): 97.5 (25 Mar 2021 07:00), Max: 98.1 (24 Mar 2021 19:00)  HR: 82 (25 Mar 2021 08:00) (67 - 100)  BP: 145/68 (25 Mar 2021 08:00) (99/49 - 184/89)  BP(mean): 98 (25 Mar 2021 08:00) (71 - 125)  RR: 19 (25 Mar 2021 08:00) (10 - 35)  SpO2: 100% (25 Mar 2021 08:00) (87% - 100%)      24 Mar 2021 07:01  -  25 Mar 2021 07:00  --------------------------------------------------------  IN:    IV PiggyBack: 50 mL    Oral Fluid: 450 mL    PRBCs (Packed Red Blood Cells): 300 mL  Total IN: 800 mL    OUT:    Voided (mL): 600 mL  Total OUT: 600 mL    Total NET: 200 mL        ___________________________________________________  PHYSICAL EXAM    -- CONSTITUTIONAL: NAD, lying in bed  -- NEURO: Awake, alert  -- PULM: Non-labored respirations  -- EXTREMITIES: L hand in splint, ring finger intact, good bleeding at nail plate, replanted digit does not appear congested, leeches continue to latch well  ___________________________________________________  LABS                        7.3    7.32  )-----------( 151      ( 25 Mar 2021 02:26 )             21.3     25 Mar 2021 02:27    140    |  108    |  8      ----------------------------<  102    3.9     |  23     |  0.71     Ca    7.9        25 Mar 2021 02:27  Phos  3.5       25 Mar 2021 02:27  Mg     1.8       25 Mar 2021 02:27      PT/INR - ( 25 Mar 2021 02:26 )   PT: 12.1 sec;   INR: 1.01 ratio         PTT - ( 25 Mar 2021 02:26 )  PTT:26.7 sec  ___________________________________________________  MEDICATIONS  (STANDING):  aspirin enteric coated 81 milliGRAM(s) Oral daily  buPROPion XL . 150 milliGRAM(s) Oral daily  chlorhexidine 2% Cloths 1 Application(s) Topical <User Schedule>  ciprofloxacin     Tablet 500 milliGRAM(s) Oral every 12 hours  enoxaparin Injectable 40 milliGRAM(s) SubCutaneous daily  estradiol 0.05 mG/24Hr(s) Patch 1 Patch Transdermal <User Schedule>  influenza   Vaccine 0.5 milliLiter(s) IntraMuscular once  metoprolol tartrate 25 milliGRAM(s) Oral every 12 hours  polyethylene glycol 3350 17 Gram(s) Oral daily  senna 2 Tablet(s) Oral at bedtime    MEDICATIONS  (PRN):  acetaminophen   Tablet .. 650 milliGRAM(s) Oral every 6 hours PRN Mild Pain (1 - 3)  oxyCODONE    IR 5 milliGRAM(s) Oral every 4 hours PRN Severe Pain (7 - 10)

## 2021-03-25 NOTE — PROGRESS NOTE ADULT - ASSESSMENT
49 y/o female w/ a PMHx of depression presenting s/p fall w/ amputation of the left ring finger s/p reimplantation requiring frequent neurovascular checks adn leech therapy    PLAN:    Neuro: acute post-op pain, depression  - Monitor mental status  - Pain control as needed with acetaminophen and oxycodone  - Home Wellbutrin    Resp: no acute issues  - Monitor pulse oximeter  - Out of bed to chair, ambulate as tolerated, and incentive spirometry to prevent atelectasis    CV: no acute issues  - Monitor hemodynamics  - Metoprolol 25 mg BID   - q2hr leech therapy for reimplanted finger     GI: no acute issues  - Regular diet as tolerated  - Bowel regimen with senna & Miralax    Renal: no acute issues  - Monitor I&Os  - Monitor electrolytes and replete as necessary    Heme: s/p finger reimplantation  - Trend H/H q6hr, transfuse PRN for Hb < 8  - Lovenox for VTE prophylaxis  - ASA 81 mg PO daily     ID: leech therapy  - Monitor for clinical evidence of active infection  - Empiric ciprofloxacin while on leech therapy    Endo: no acute issues  - Monitor glucose on BMP    Code Status: Full code    Disposition:  SICU

## 2021-03-26 LAB
APTT BLD: 28.7 SEC — SIGNIFICANT CHANGE UP (ref 27.5–35.5)
INR BLD: 0.9 RATIO — SIGNIFICANT CHANGE UP (ref 0.88–1.16)
PROTHROM AB SERPL-ACNC: 10.8 SEC — SIGNIFICANT CHANGE UP (ref 10.6–13.6)

## 2021-03-26 PROCEDURE — 99232 SBSQ HOSP IP/OBS MODERATE 35: CPT

## 2021-03-26 RX ORDER — HYDROMORPHONE HYDROCHLORIDE 2 MG/ML
0.25 INJECTION INTRAMUSCULAR; INTRAVENOUS; SUBCUTANEOUS ONCE
Refills: 0 | Status: DISCONTINUED | OUTPATIENT
Start: 2021-03-26 | End: 2021-03-26

## 2021-03-26 RX ADMIN — POLYETHYLENE GLYCOL 3350 17 GRAM(S): 17 POWDER, FOR SOLUTION ORAL at 11:29

## 2021-03-26 RX ADMIN — ENOXAPARIN SODIUM 40 MILLIGRAM(S): 100 INJECTION SUBCUTANEOUS at 11:29

## 2021-03-26 RX ADMIN — HYDROMORPHONE HYDROCHLORIDE 0.25 MILLIGRAM(S): 2 INJECTION INTRAMUSCULAR; INTRAVENOUS; SUBCUTANEOUS at 06:55

## 2021-03-26 RX ADMIN — Medication 1 PATCH: at 19:00

## 2021-03-26 RX ADMIN — Medication 81 MILLIGRAM(S): at 12:38

## 2021-03-26 RX ADMIN — Medication 1 PATCH: at 09:10

## 2021-03-26 RX ADMIN — BUPROPION HYDROCHLORIDE 150 MILLIGRAM(S): 150 TABLET, EXTENDED RELEASE ORAL at 11:29

## 2021-03-26 RX ADMIN — CHLORHEXIDINE GLUCONATE 1 APPLICATION(S): 213 SOLUTION TOPICAL at 05:09

## 2021-03-26 RX ADMIN — HYDROMORPHONE HYDROCHLORIDE 0.25 MILLIGRAM(S): 2 INJECTION INTRAMUSCULAR; INTRAVENOUS; SUBCUTANEOUS at 06:40

## 2021-03-26 RX ADMIN — Medication 25 MILLIGRAM(S): at 17:22

## 2021-03-26 RX ADMIN — Medication 500 MILLIGRAM(S): at 05:14

## 2021-03-26 RX ADMIN — Medication 50 GRAM(S): at 05:09

## 2021-03-26 RX ADMIN — Medication 20 MILLIEQUIVALENT(S): at 05:09

## 2021-03-26 RX ADMIN — Medication 500 MILLIGRAM(S): at 17:22

## 2021-03-26 NOTE — PROGRESS NOTE ADULT - SUBJECTIVE AND OBJECTIVE BOX
Plastic Surgery Progress Note (pg [facility]: [id number], [facility]: [phone])    SUBJECTIVE:   Seen and examined. No acute events overnight. Received 1u of pRBCs     OBJECTIVE: T(C): 36.6 (03-26-21 @ 03:00), Max: 37.1 (03-25-21 @ 23:00)  HR: 66 (03-26-21 @ 06:00) (58 - 100)  BP: 109/55 (03-26-21 @ 06:00) (100/56 - 160/67)  RR: 15 (03-26-21 @ 06:00) (14 - 44)  SpO2: 100% (03-26-21 @ 06:00) (90% - 100%)  Wt(kg): --  I&O's Summary    25 Mar 2021 07:01  -  26 Mar 2021 07:00  --------------------------------------------------------  IN: 800 mL / OUT: 0 mL / NET: 800 mL      I&O's Detail    25 Mar 2021 07:01  -  26 Mar 2021 07:00  --------------------------------------------------------  IN:    IV PiggyBack: 300 mL    Oral Fluid: 500 mL  Total IN: 800 mL    OUT:  Total OUT: 0 mL    Total NET: 800 mL        -- CONSTITUTIONAL: NAD, lying in bed  -- NEURO: Awake, alert  -- PULM: Non-labored respirations  -- EXTREMITIES: L hand in splint, ring finger intact, good bleeding at nail plate, replanted digit does not appear congested, leeches not latching well, cap refill 2-3s     MEDICATIONS  (STANDING):  aspirin enteric coated 81 milliGRAM(s) Oral daily  buPROPion XL . 150 milliGRAM(s) Oral daily  chlorhexidine 2% Cloths 1 Application(s) Topical <User Schedule>  ciprofloxacin     Tablet 500 milliGRAM(s) Oral every 12 hours  enoxaparin Injectable 40 milliGRAM(s) SubCutaneous daily  estradiol 0.05 mG/24Hr(s) Patch 1 Patch Transdermal <User Schedule>  influenza   Vaccine 0.5 milliLiter(s) IntraMuscular once  metoprolol tartrate 25 milliGRAM(s) Oral every 12 hours  polyethylene glycol 3350 17 Gram(s) Oral daily  senna 2 Tablet(s) Oral at bedtime    MEDICATIONS  (PRN):  acetaminophen   Tablet .. 650 milliGRAM(s) Oral every 6 hours PRN Mild Pain (1 - 3)  oxyCODONE    IR 5 milliGRAM(s) Oral every 4 hours PRN Severe Pain (7 - 10)      LABS:                        7.8    7.41  )-----------( 178      ( 25 Mar 2021 22:57 )             23.3     03-25    138  |  107  |  12  ----------------------------<  104<H>  3.9   |  23  |  0.74    Ca    8.4      25 Mar 2021 22:57  Phos  4.0     03-25  Mg     1.9     03-25      PT/INR - ( 25 Mar 2021 22:57 )   PT: 10.8 sec;   INR: 0.90 ratio         PTT - ( 25 Mar 2021 22:57 )  PTT:28.7 sec       Plastic Surgery Progress Note (pg [facility]: [id number], [facility]: [phone])    SUBJECTIVE:   Seen and examined. No acute events overnight. Received 1u of pRBCs     OBJECTIVE: T(C): 36.6 (03-26-21 @ 03:00), Max: 37.1 (03-25-21 @ 23:00)  HR: 66 (03-26-21 @ 06:00) (58 - 100)  BP: 109/55 (03-26-21 @ 06:00) (100/56 - 160/67)  RR: 15 (03-26-21 @ 06:00) (14 - 44)  SpO2: 100% (03-26-21 @ 06:00) (90% - 100%)  Wt(kg): --  I&O's Summary    25 Mar 2021 07:01  -  26 Mar 2021 07:00  --------------------------------------------------------  IN: 800 mL / OUT: 0 mL / NET: 800 mL      I&O's Detail    25 Mar 2021 07:01  -  26 Mar 2021 07:00  --------------------------------------------------------  IN:    IV PiggyBack: 300 mL    Oral Fluid: 500 mL  Total IN: 800 mL    OUT:  Total OUT: 0 mL    Total NET: 800 mL        -- CONSTITUTIONAL: NAD, lying in bed  -- NEURO: Awake, alert  -- PULM: Non-labored respirations  -- EXTREMITIES: L hand in splint, ring finger intact, good bleeding at nail plate, replanted congested, cap refill 2-3s     MEDICATIONS  (STANDING):  aspirin enteric coated 81 milliGRAM(s) Oral daily  buPROPion XL . 150 milliGRAM(s) Oral daily  chlorhexidine 2% Cloths 1 Application(s) Topical <User Schedule>  ciprofloxacin     Tablet 500 milliGRAM(s) Oral every 12 hours  enoxaparin Injectable 40 milliGRAM(s) SubCutaneous daily  estradiol 0.05 mG/24Hr(s) Patch 1 Patch Transdermal <User Schedule>  influenza   Vaccine 0.5 milliLiter(s) IntraMuscular once  metoprolol tartrate 25 milliGRAM(s) Oral every 12 hours  polyethylene glycol 3350 17 Gram(s) Oral daily  senna 2 Tablet(s) Oral at bedtime    MEDICATIONS  (PRN):  acetaminophen   Tablet .. 650 milliGRAM(s) Oral every 6 hours PRN Mild Pain (1 - 3)  oxyCODONE    IR 5 milliGRAM(s) Oral every 4 hours PRN Severe Pain (7 - 10)      LABS:                        7.8    7.41  )-----------( 178      ( 25 Mar 2021 22:57 )             23.3     03-25    138  |  107  |  12  ----------------------------<  104<H>  3.9   |  23  |  0.74    Ca    8.4      25 Mar 2021 22:57  Phos  4.0     03-25  Mg     1.9     03-25      PT/INR - ( 25 Mar 2021 22:57 )   PT: 10.8 sec;   INR: 0.90 ratio         PTT - ( 25 Mar 2021 22:57 )  PTT:28.7 sec

## 2021-03-26 NOTE — PROGRESS NOTE ADULT - ASSESSMENT
49 y/o female w/ a PMHx of depression presenting s/p fall w/ amputation of the left ring finger s/p reimplantation requiring frequent neurovascular checks and leech therapy    PLAN:    Neuro: acute post-op pain, depression  - Monitor mental status  - Pain control as needed with acetaminophen and oxycodone  - Home Wellbutrin    Resp: no acute issues  - Monitor pulse oximeter  - Out of bed to chair, ambulate as tolerated, and incentive spirometry to prevent atelectasis    CV: no acute issues  - Monitor hemodynamics  - Metoprolol 25 mg BID   - q2hr leech therapy for reimplanted finger     GI: no acute issues  - Regular diet as tolerated  - Bowel regimen with senna & Miralax    Renal: no acute issues  - Monitor I&Os  - Monitor electrolytes and replete as necessary    Heme: s/p finger reimplantation  - Trend H/H q6hr, transfuse PRN for Hb < 8  - Lovenox for VTE prophylaxis  - ASA 81 mg PO daily     ID: leech therapy  - Monitor for clinical evidence of active infection  - Empiric ciprofloxacin while on leech therapy    Endo: no acute issues  - Monitor glucose on BMP    Code Status: Full code    Disposition:  SICU

## 2021-03-26 NOTE — PROGRESS NOTE ADULT - SUBJECTIVE AND OBJECTIVE BOX
SURGICAL INTENSIVE CARE UNIT DAILY PROGRESS NOTE    24 HOUR EVENTS:  - 1 unit of pRBCs given for downtrended H/H  - Remains on leech therapy q2 hr    SUBJECTIVE/ROS:  [x] A ten-point review of systems was otherwise negative except as noted.  [ ] Due to altered mental status/intubation, subjective information were not able to be obtained from the patient. History was obtained, to the extent possible, from review of the chart and collateral sources of information.    NEURO  Exam:   Meds: acetaminophen   Tablet .. 650 milliGRAM(s) Oral every 6 hours PRN Mild Pain (1 - 3)  buPROPion XL . 150 milliGRAM(s) Oral daily  oxyCODONE    IR 5 milliGRAM(s) Oral every 4 hours PRN Severe Pain (7 - 10)    RESPIRATORY  RR: 16 (03-26-21 @ 01:00) (10 - 44)  SpO2: 100% (03-26-21 @ 01:00) (90% - 100%)  Exam: No increased work of breathing   Meds: none    CARDIOVASCULAR  HR: 58 (03-26-21 @ 01:00) (58 - 100)  BP: 126/61 (03-26-21 @ 01:00) (103/51 - 160/67)  BP(mean): 88 (03-26-21 @ 01:00) (73 - 122)  Exam: hemodynamically stable  Meds: metoprolol tartrate 25 milliGRAM(s) Oral every 12 hours    GI/NUTRITION  Exam: NTND  Diet: regular  Meds: polyethylene glycol 3350 17 Gram(s) Oral daily  senna 2 Tablet(s) Oral at bedtime    GENITOURINARY  I&O's Detail    03-24 @ 07:01  -  03-25 @ 07:00  --------------------------------------------------------  IN:    IV PiggyBack: 50 mL    Oral Fluid: 450 mL    PRBCs (Packed Red Blood Cells): 300 mL  Total IN: 800 mL    OUT:    Voided (mL): 600 mL  Total OUT: 600 mL    Total NET: 200 mL    03-25 @ 07:01  -  03-26 @ 02:26  --------------------------------------------------------  IN:    IV PiggyBack: 300 mL    Oral Fluid: 500 mL  Total IN: 800 mL    OUT:  Total OUT: 0 mL    Total NET: 800 mL    03-25    138  |  107  |  12  ----------------------------<  104<H>  3.9   |  23  |  0.74    Ca    8.4      25 Mar 2021 22:57  Phos  4.0     03-25  Mg     1.9     03-25    Meds: magnesium sulfate  IVPB 2 Gram(s) IV Intermittent once  potassium chloride    Tablet ER 20 milliEquivalent(s) Oral once    HEMATOLOGIC  Meds: aspirin enteric coated 81 milliGRAM(s) Oral daily  enoxaparin Injectable 40 milliGRAM(s) SubCutaneous daily  [x] VTE Prophylaxis                        7.8    7.41  )-----------( 178      ( 25 Mar 2021 22:57 )             23.3     PT/INR - ( 25 Mar 2021 22:57 )   PT: 10.8 sec;   INR: 0.90 ratio      PTT - ( 25 Mar 2021 22:57 )  PTT:28.7 sec  Transfusion     [x] PRBC   [ ] Platelets   [ ] FFP   [ ] Cryoprecipitate    INFECTIOUS DISEASES  T(C): 37.1 (03-25-21 @ 23:00), Max: 37.1 (03-25-21 @ 23:00)  Wt(kg): --  WBC Count: 7.41 K/uL (03-25 @ 22:57)  WBC Count: 7.37 K/uL (03-25 @ 18:36)  WBC Count: 7.84 K/uL (03-25 @ 10:27)  Recent Cultures: none  Meds: ciprofloxacin     Tablet 500 milliGRAM(s) Oral every 12 hours  influenza   Vaccine 0.5 milliLiter(s) IntraMuscular once    ENDOCRINE  Capillary Blood Glucose  Meds: estradiol 0.05 mG/24Hr(s) Patch 1 Patch Transdermal <User Schedule>    OTHER MEDICATIONS:  chlorhexidine 2% Cloths 1 Application(s) Topical <User Schedule>    CODE STATUS: full code    IMAGING: x SURGICAL INTENSIVE CARE UNIT DAILY PROGRESS NOTE    24 HOUR EVENTS:  - 1 unit of pRBCs given for downtrended H/H  - Remains on leech therapy q2 hr    HPI: 51 y/o female w/ a PMHx of depression who presented today to [facility] after slipping down the stairs. Patient attempted to grab the railing to break her fall, which resulted in amputation of the left ring finger. She was subsequently transferred to [facility] for reimplantation. Case was uneventful. Patient admitted to SICU post-operatively for leech therapy and frequent neurovascular checks. Patient currently denies fevers, headache, dizziness, weakness, shortness of breath, chest pain, abdominal pain, or nausea/vomiting.    SUBJECTIVE/ROS:  [x] A ten-point review of systems was otherwise negative except as noted.  [ ] Due to altered mental status/intubation, subjective information were not able to be obtained from the patient. History was obtained, to the extent possible, from review of the chart and collateral sources of information.    NEURO  Exam: Awake, alert, oriented  Meds: acetaminophen   Tablet .. 650 milliGRAM(s) Oral every 6 hours PRN Mild Pain (1 - 3)  buPROPion XL . 150 milliGRAM(s) Oral daily  oxyCODONE    IR 5 milliGRAM(s) Oral every 4 hours PRN Severe Pain (7 - 10)    RESPIRATORY  RR: 16 (03-26-21 @ 01:00) (10 - 44)  SpO2: 100% (03-26-21 @ 01:00) (90% - 100%)  Exam: No increased work of breathing   Meds: none    CARDIOVASCULAR  HR: 58 (03-26-21 @ 01:00) (58 - 100)  BP: 126/61 (03-26-21 @ 01:00) (103/51 - 160/67)  BP(mean): 88 (03-26-21 @ 01:00) (73 - 122)  Exam: hemodynamically stable  Meds: metoprolol tartrate 25 milliGRAM(s) Oral every 12 hours    GI/NUTRITION  Exam: NTND  Diet: regular  Meds: polyethylene glycol 3350 17 Gram(s) Oral daily  senna 2 Tablet(s) Oral at bedtime    GENITOURINARY  I&O's Detail    03-24 @ 07:01  -  03-25 @ 07:00  --------------------------------------------------------  IN:    IV PiggyBack: 50 mL    Oral Fluid: 450 mL    PRBCs (Packed Red Blood Cells): 300 mL  Total IN: 800 mL    OUT:    Voided (mL): 600 mL  Total OUT: 600 mL    Total NET: 200 mL    03-25 @ 07:01  -  03-26 @ 02:26  --------------------------------------------------------  IN:    IV PiggyBack: 300 mL    Oral Fluid: 500 mL  Total IN: 800 mL    OUT:  Total OUT: 0 mL    Total NET: 800 mL    03-25    138  |  107  |  12  ----------------------------<  104<H>  3.9   |  23  |  0.74    Ca    8.4      25 Mar 2021 22:57  Phos  4.0     03-25  Mg     1.9     03-25    Meds: magnesium sulfate  IVPB 2 Gram(s) IV Intermittent once  potassium chloride    Tablet ER 20 milliEquivalent(s) Oral once    HEMATOLOGIC  Meds: aspirin enteric coated 81 milliGRAM(s) Oral daily  enoxaparin Injectable 40 milliGRAM(s) SubCutaneous daily  [x] VTE Prophylaxis                        7.8    7.41  )-----------( 178      ( 25 Mar 2021 22:57 )             23.3     PT/INR - ( 25 Mar 2021 22:57 )   PT: 10.8 sec;   INR: 0.90 ratio      PTT - ( 25 Mar 2021 22:57 )  PTT:28.7 sec  Transfusion     [x] PRBC   [ ] Platelets   [ ] FFP   [ ] Cryoprecipitate    INFECTIOUS DISEASES  T(C): 37.1 (03-25-21 @ 23:00), Max: 37.1 (03-25-21 @ 23:00)  Wt(kg): --  WBC Count: 7.41 K/uL (03-25 @ 22:57)  WBC Count: 7.37 K/uL (03-25 @ 18:36)  WBC Count: 7.84 K/uL (03-25 @ 10:27)  Recent Cultures: none  Meds: ciprofloxacin     Tablet 500 milliGRAM(s) Oral every 12 hours  influenza   Vaccine 0.5 milliLiter(s) IntraMuscular once    ENDOCRINE  Capillary Blood Glucose  Meds: estradiol 0.05 mG/24Hr(s) Patch 1 Patch Transdermal <User Schedule>    OTHER MEDICATIONS:  chlorhexidine 2% Cloths 1 Application(s) Topical <User Schedule>    CODE STATUS: full code    IMAGING: x SURGICAL INTENSIVE CARE UNIT DAILY PROGRESS NOTE    24 HOUR EVENTS:  - 2 units of pRBCs given for downtrended H/H  - Remains on leech therapy q2 hr    HPI: 51 y/o female w/ a PMHx of depression who presented today to [facility] after slipping down the stairs. Patient attempted to grab the railing to break her fall, which resulted in amputation of the left ring finger. She was subsequently transferred to [facility] for reimplantation. Case was uneventful. Patient admitted to SICU post-operatively for leech therapy and frequent neurovascular checks. Patient currently denies fevers, headache, dizziness, weakness, shortness of breath, chest pain, abdominal pain, or nausea/vomiting.    SUBJECTIVE/ROS:  [x] A ten-point review of systems was otherwise negative except as noted.  [ ] Due to altered mental status/intubation, subjective information were not able to be obtained from the patient. History was obtained, to the extent possible, from review of the chart and collateral sources of information.    NEURO  Exam: Awake, alert, oriented  Meds: acetaminophen   Tablet .. 650 milliGRAM(s) Oral every 6 hours PRN Mild Pain (1 - 3)  buPROPion XL . 150 milliGRAM(s) Oral daily  oxyCODONE    IR 5 milliGRAM(s) Oral every 4 hours PRN Severe Pain (7 - 10)    RESPIRATORY  RR: 16 (03-26-21 @ 01:00) (10 - 44)  SpO2: 100% (03-26-21 @ 01:00) (90% - 100%)  Exam: No increased work of breathing   Meds: none    CARDIOVASCULAR  HR: 58 (03-26-21 @ 01:00) (58 - 100)  BP: 126/61 (03-26-21 @ 01:00) (103/51 - 160/67)  BP(mean): 88 (03-26-21 @ 01:00) (73 - 122)  Exam: hemodynamically stable  Meds: metoprolol tartrate 25 milliGRAM(s) Oral every 12 hours    GI/NUTRITION  Exam: NTND  Diet: regular  Meds: polyethylene glycol 3350 17 Gram(s) Oral daily  senna 2 Tablet(s) Oral at bedtime    GENITOURINARY  I&O's Detail    03-24 @ 07:01  -  03-25 @ 07:00  --------------------------------------------------------  IN:    IV PiggyBack: 50 mL    Oral Fluid: 450 mL    PRBCs (Packed Red Blood Cells): 300 mL  Total IN: 800 mL    OUT:    Voided (mL): 600 mL  Total OUT: 600 mL    Total NET: 200 mL    03-25 @ 07:01  -  03-26 @ 02:26  --------------------------------------------------------  IN:    IV PiggyBack: 300 mL    Oral Fluid: 500 mL  Total IN: 800 mL    OUT:  Total OUT: 0 mL    Total NET: 800 mL    03-25    138  |  107  |  12  ----------------------------<  104<H>  3.9   |  23  |  0.74    Ca    8.4      25 Mar 2021 22:57  Phos  4.0     03-25  Mg     1.9     03-25    Meds: magnesium sulfate  IVPB 2 Gram(s) IV Intermittent once  potassium chloride    Tablet ER 20 milliEquivalent(s) Oral once    HEMATOLOGIC  Meds: aspirin enteric coated 81 milliGRAM(s) Oral daily  enoxaparin Injectable 40 milliGRAM(s) SubCutaneous daily  [x] VTE Prophylaxis                        7.8    7.41  )-----------( 178      ( 25 Mar 2021 22:57 )             23.3     PT/INR - ( 25 Mar 2021 22:57 )   PT: 10.8 sec;   INR: 0.90 ratio      PTT - ( 25 Mar 2021 22:57 )  PTT:28.7 sec  Transfusion     [x] PRBC   [ ] Platelets   [ ] FFP   [ ] Cryoprecipitate    INFECTIOUS DISEASES  T(C): 37.1 (03-25-21 @ 23:00), Max: 37.1 (03-25-21 @ 23:00)  Wt(kg): --  WBC Count: 7.41 K/uL (03-25 @ 22:57)  WBC Count: 7.37 K/uL (03-25 @ 18:36)  WBC Count: 7.84 K/uL (03-25 @ 10:27)  Recent Cultures: none  Meds: ciprofloxacin     Tablet 500 milliGRAM(s) Oral every 12 hours  influenza   Vaccine 0.5 milliLiter(s) IntraMuscular once    ENDOCRINE  Capillary Blood Glucose  Meds: estradiol 0.05 mG/24Hr(s) Patch 1 Patch Transdermal <User Schedule>    OTHER MEDICATIONS:  chlorhexidine 2% Cloths 1 Application(s) Topical <User Schedule>    CODE STATUS: full code    IMAGING: x

## 2021-03-26 NOTE — PROGRESS NOTE ADULT - ASSESSMENT
ASSESSMENT/PLAN:   MICHEL ONEIL is a 50yFemale s/p fall w/ amputation of the left ring finger now s/p L RF replant on 3/20. Recovering appropriately in SICU    - repeat CBCs, transfuse as needed  - Keep hand in dorsal extension block splint  - Ok to change underlying marge and ABDs as needed  - ASA 81 mg PO daily  - Continue leech therapy to replanted digit, switch to q4 leeches   - Empiric ciprofloxacin while on leech therapy  - Continue Neurovascular checks  - Pain control  - LUE elevation, bleeding to be expected  - Continue DVT prophylaxis    Plastic Surgery   [facility]: [id number]  [facility]: [phone] ASSESSMENT/PLAN:   MICHEL ONEIL is a 50yFemale s/p fall w/ amputation of the left ring finger now s/p L RF replant on 3/20. Recovering appropriately in SICU    - repeat CBCs, transfuse as needed  - Ok to change underlying marge and ABDs as needed  - ASA 81 mg PO daily  - Continue leech therapy to replanted digit, switch to q4 leeches   - Empiric ciprofloxacin while on leech therapy  - Continue Neurovascular checks q1  - Pain control  - LUE elevation, bleeding to be expected  - Continue DVT prophylaxis    Plastic Surgery   [facility]: [id number]  [facility]: [phone]

## 2021-03-26 NOTE — CHART NOTE - NSCHARTNOTEFT_GEN_A_CORE
Nutrition Follow Up Note     Patient seen for: nutrition follow up on SICU    Source: patient, medical record, communication with team.     Chart reviewed, events noted.  50yFemale s/p fall w/ amputation of the left ring finger now s/p L RF replant on 3/20.     Per chart: "Continue leech therapy to replanted digit, switch to q4 leeches."    Diet Order: Diet, Regular (03-20-21 @ 22:35)    Nutrition Events:   - PO Intake: Pt is tolerating Regular diet.  - BMI <19 noted; pt does not meet malnutrition criteria    Last BM 3/20. Bowel regimen: Miralax, senna    Anthropometric Measurements:   Height (cm): 167.6 (03-20-21 @ 21:55)  Weight (kg): 44.3 (03-20-21 @ 21:55) - likely not accurate  BMI (kg/m2): 15.8 (03-20-21 @ 21:55) - likely not accurate    Current Weight (kg): 51.3 (3-26-21) - likely accurate based on reported UBW  Current BMI: 18.2 kg/m2  IBW: 58.9 kg  Stated UBW: 50.4-52.2 kg    Medications: MEDICATIONS  (STANDING):  aspirin enteric coated 81 milliGRAM(s) Oral daily  buPROPion XL . 150 milliGRAM(s) Oral daily  chlorhexidine 2% Cloths 1 Application(s) Topical <User Schedule>  ciprofloxacin     Tablet 500 milliGRAM(s) Oral every 12 hours  enoxaparin Injectable 40 milliGRAM(s) SubCutaneous daily  estradiol 0.05 mG/24Hr(s) Patch 1 Patch Transdermal <User Schedule>  influenza   Vaccine 0.5 milliLiter(s) IntraMuscular once  metoprolol tartrate 25 milliGRAM(s) Oral every 12 hours  polyethylene glycol 3350 17 Gram(s) Oral daily  senna 2 Tablet(s) Oral at bedtime    MEDICATIONS  (PRN):  acetaminophen   Tablet .. 650 milliGRAM(s) Oral every 6 hours PRN Mild Pain (1 - 3)  oxyCODONE    IR 5 milliGRAM(s) Oral every 4 hours PRN Severe Pain (7 - 10)    Labs: 03-25 @ 22:57: Sodium 138, Potassium 3.9, Calcium 8.4, Magnesium 1.9, Phosphorus 4.0, BUN 12, Creatinine 0.74, Glucose 104<H>, Hemoglobin 7.8<L>, Hematocrit 23.3<L>  03-25 @ 18:36: Hemoglobin 8.2<L>, Hematocrit 24.4<L>    Skin per nursing documentation: no pressure injuries documented  Edema: 2+ left 4th finger    Estimated Needs: based on current (3/26) wt 51.3 kg  Energy: ~1539 calories (30 sergey/kg)  Protein:  ~62 grams (1.2 gm/kg)    Previous Nutrition Diagnosis: Underweight  Nutrition Diagnosis is: remains appropriate; addressed with po diet     New Nutrition Diagnosis: none    Recommended Interventions:   1. Continue Regular diet.  2. Mighty Shake supplement (200 calories, 6 gm protein) added to meal trays.     Monitoring and Evaluation:   Continue to monitor nutrition provision and tolerance, weights, labs, skin integrity.   RD remains available upon request and will follow up per protocol.    Salma Altman MS RD CDN [facility]; Pager # [phone]

## 2021-03-27 LAB
ANION GAP SERPL CALC-SCNC: 8 MMOL/L — SIGNIFICANT CHANGE UP (ref 5–17)
APTT BLD: 28.9 SEC — SIGNIFICANT CHANGE UP (ref 27.5–35.5)
BUN SERPL-MCNC: 11 MG/DL — SIGNIFICANT CHANGE UP (ref 7–23)
CALCIUM SERPL-MCNC: 8.5 MG/DL — SIGNIFICANT CHANGE UP (ref 8.4–10.5)
CHLORIDE SERPL-SCNC: 107 MMOL/L — SIGNIFICANT CHANGE UP (ref 96–108)
CO2 SERPL-SCNC: 23 MMOL/L — SIGNIFICANT CHANGE UP (ref 22–31)
CREAT SERPL-MCNC: 0.76 MG/DL — SIGNIFICANT CHANGE UP (ref 0.5–1.3)
GLUCOSE SERPL-MCNC: 99 MG/DL — SIGNIFICANT CHANGE UP (ref 70–99)
HCT VFR BLD CALC: 25 % — LOW (ref 34.5–45)
HGB BLD-MCNC: 8.3 G/DL — LOW (ref 11.5–15.5)
INR BLD: 0.97 RATIO — SIGNIFICANT CHANGE UP (ref 0.88–1.16)
MAGNESIUM SERPL-MCNC: 1.8 MG/DL — SIGNIFICANT CHANGE UP (ref 1.6–2.6)
MCHC RBC-ENTMCNC: 30.2 PG — SIGNIFICANT CHANGE UP (ref 27–34)
MCHC RBC-ENTMCNC: 33.2 GM/DL — SIGNIFICANT CHANGE UP (ref 32–36)
MCV RBC AUTO: 90.9 FL — SIGNIFICANT CHANGE UP (ref 80–100)
NRBC # BLD: 0 /100 WBCS — SIGNIFICANT CHANGE UP (ref 0–0)
PHOSPHATE SERPL-MCNC: 4.3 MG/DL — SIGNIFICANT CHANGE UP (ref 2.5–4.5)
PLATELET # BLD AUTO: 213 K/UL — SIGNIFICANT CHANGE UP (ref 150–400)
POTASSIUM SERPL-MCNC: 4.1 MMOL/L — SIGNIFICANT CHANGE UP (ref 3.5–5.3)
POTASSIUM SERPL-SCNC: 4.1 MMOL/L — SIGNIFICANT CHANGE UP (ref 3.5–5.3)
PROTHROM AB SERPL-ACNC: 11.7 SEC — SIGNIFICANT CHANGE UP (ref 10.6–13.6)
RBC # BLD: 2.75 M/UL — LOW (ref 3.8–5.2)
RBC # FLD: 17.5 % — HIGH (ref 10.3–14.5)
SODIUM SERPL-SCNC: 138 MMOL/L — SIGNIFICANT CHANGE UP (ref 135–145)
WBC # BLD: 6.84 K/UL — SIGNIFICANT CHANGE UP (ref 3.8–10.5)
WBC # FLD AUTO: 6.84 K/UL — SIGNIFICANT CHANGE UP (ref 3.8–10.5)

## 2021-03-27 PROCEDURE — 99232 SBSQ HOSP IP/OBS MODERATE 35: CPT

## 2021-03-27 RX ORDER — MAGNESIUM SULFATE 500 MG/ML
2 VIAL (ML) INJECTION ONCE
Refills: 0 | Status: COMPLETED | OUTPATIENT
Start: 2021-03-27 | End: 2021-03-27

## 2021-03-27 RX ADMIN — Medication 1 PATCH: at 21:39

## 2021-03-27 RX ADMIN — POLYETHYLENE GLYCOL 3350 17 GRAM(S): 17 POWDER, FOR SOLUTION ORAL at 12:12

## 2021-03-27 RX ADMIN — Medication 81 MILLIGRAM(S): at 12:12

## 2021-03-27 RX ADMIN — CHLORHEXIDINE GLUCONATE 1 APPLICATION(S): 213 SOLUTION TOPICAL at 05:30

## 2021-03-27 RX ADMIN — BUPROPION HYDROCHLORIDE 150 MILLIGRAM(S): 150 TABLET, EXTENDED RELEASE ORAL at 12:12

## 2021-03-27 RX ADMIN — Medication 50 GRAM(S): at 05:00

## 2021-03-27 RX ADMIN — ENOXAPARIN SODIUM 40 MILLIGRAM(S): 100 INJECTION SUBCUTANEOUS at 12:12

## 2021-03-27 RX ADMIN — Medication 500 MILLIGRAM(S): at 05:30

## 2021-03-27 RX ADMIN — Medication 500 MILLIGRAM(S): at 17:09

## 2021-03-27 RX ADMIN — Medication 1 PATCH: at 07:00

## 2021-03-27 RX ADMIN — Medication 25 MILLIGRAM(S): at 17:09

## 2021-03-27 RX ADMIN — Medication 25 MILLIGRAM(S): at 05:30

## 2021-03-27 NOTE — PROGRESS NOTE ADULT - SUBJECTIVE AND OBJECTIVE BOX
HISTORY  51 y/o female w/ a PMHx of depression who presented today to [facility] after slipping down the stairs. Patient attempted to grab the railing to break her fall, which resulted in amputation of the left ring finger. She was subsequently transferred to [facility] for reimplantation. Case was uneventful. Patient admitted to SICU post-operatively for leech therapy and frequent neurovascular checks. Patient currently denies fevers, headache, dizziness, weakness, shortness of breath, chest pain, abdominal pain, or nausea/vomiting.      24 HOUR EVENTS:  - none      NEURO  Exam: AOx4, following commands  Meds: acetaminophen   Tablet .. 650 milliGRAM(s) Oral every 6 hours PRN Mild Pain (1 - 3)  buPROPion XL . 150 milliGRAM(s) Oral daily  oxyCODONE    IR 5 milliGRAM(s) Oral every 4 hours PRN Severe Pain (7 - 10)  [x] Adequacy of sedation and pain control has been assessed and adjusted      RESPIRATORY  RR: 18 (03-27-21 @ 03:00) (14 - 34)  SpO2: 100% (03-27-21 @ 03:00) (89% - 100%)  Exam: unlabored respirations saturating well on room air  Meds:       CARDIOVASCULAR  HR: 68 (03-27-21 @ 03:00) (59 - 85)  BP: 140/64 (03-27-21 @ 03:00) (109/55 - 175/76)  BP(mean): 92 (03-27-21 @ 03:00) (78 - 110)  Exam: RRR  Cardiac Rhythm: normal sinus  Perfusion     [x]Adequate   [ ]Inadequate  Mentation   [x]Normal       [ ]Reduced  Extremities  [x]Warm         [ ]Cool  Volume Status [ ]Hypervolemic [x]Euvolemic [ ]Hypovolemic  Meds: metoprolol tartrate 25 milliGRAM(s) Oral every 12 hours      GI/NUTRITION  Exam: soft, nontender, nondistended  Diet: regular  Meds: polyethylene glycol 3350 17 Gram(s) Oral daily  senna 2 Tablet(s) Oral at bedtime      GENITOURINARY  I&O's Detail    03-25 @ 07:01  -  03-26 @ 07:00  --------------------------------------------------------  IN:    IV PiggyBack: 300 mL    Oral Fluid: 500 mL  Total IN: 800 mL    OUT:    Voided (mL): 1 mL  Total OUT: 1 mL    Total NET: 799 mL    03-27    138  |  107  |  11  ----------------------------<  99  4.1   |  23  |  0.76    Ca    8.5      27 Mar 2021 00:39  Phos  4.3     03-27  Mg     1.8     03-27    [ ] Croft catheter, indication: none  Meds: magnesium sulfate  IVPB 2 Gram(s) IV Intermittent once      HEMATOLOGIC  Meds: aspirin enteric coated 81 milliGRAM(s) Oral daily  enoxaparin Injectable 40 milliGRAM(s) SubCutaneous daily  [x] VTE Prophylaxis                        8.3    6.84  )-----------( 213      ( 27 Mar 2021 00:39 )             25.0     PT/INR - ( 27 Mar 2021 00:39 )   PT: 11.7 sec;   INR: 0.97 ratio    PTT - ( 27 Mar 2021 00:39 )  PTT:28.9 sec  Transfusion     [ ] PRBC   [ ] Platelets   [ ] FFP   [ ] Cryoprecipitate      INFECTIOUS DISEASES  T(C): 36.9 (03-27-21 @ 03:00), Max: 36.9 (03-27-21 @ 03:00)  WBC Count: 6.84 K/uL (03-27 @ 00:39)    Recent Cultures:    Meds: ciprofloxacin     Tablet 500 milliGRAM(s) Oral every 12 hours  influenza   Vaccine 0.5 milliLiter(s) IntraMuscular once      ENDOCRINE  Capillary Blood Glucose    Meds: estradiol 0.05 mG/24Hr(s) Patch 1 Patch Transdermal <User Schedule>      ACCESS DEVICES:  [x] Peripheral IV  [ ] Central Venous Line	[ ] R	[ ] L	[ ] IJ	[ ] Fem	[ ] SC	Placed:   [ ] Arterial Line		[ ] R	[ ] L	[ ] Fem	[ ] Rad	[ ] Ax	Placed:   [ ] PICC:					[ ] Mediport  [ ] Urinary Catheter, Date Placed:   [x] Necessity of urinary, arterial, and venous catheters discussed    OTHER MEDICATIONS:  chlorhexidine 2% Cloths 1 Application(s) Topical <User Schedule>    CODE STATUS: full code    IMAGING:

## 2021-03-27 NOTE — PROGRESS NOTE ADULT - SUBJECTIVE AND OBJECTIVE BOX
Plastic Surgery Progress Note (pg [facility]: [id number], [facility]: [phone])    SUBJECTIVE  The patient was seen and examined. No acute events overnight. Has been compliant with leeches. Finger with good SpO2.     OBJECTIVE  ___________________________________________________  VITAL SIGNS / I&O's   Vital Signs Last 24 Hrs  T(C): 36.4 (27 Mar 2021 07:00), Max: 36.9 (27 Mar 2021 03:00)  T(F): 97.5 (27 Mar 2021 07:00), Max: 98.4 (27 Mar 2021 03:00)  HR: 63 (27 Mar 2021 08:00) (59 - 85)  BP: 125/76 (27 Mar 2021 08:00) (106/64 - 175/76)  BP(mean): 91 (27 Mar 2021 08:00) (79 - 109)  RR: 35 (27 Mar 2021 08:00) (14 - 35)  SpO2: 99% (27 Mar 2021 08:00) (89% - 100%)      26 Mar 2021 07:01  -  27 Mar 2021 07:00  --------------------------------------------------------  IN:    IV PiggyBack: 50 mL  Total IN: 50 mL    OUT:  Total OUT: 0 mL    Total NET: 50 mL        ___________________________________________________  PHYSICAL EXAM    NAD  L Ring Finger: Leech currently on the sterile matrix and there is appropriate oozing. There is good CR and color to digit.     ___________________________________________________  LABS                        8.3    6.84  )-----------( 213      ( 27 Mar 2021 00:39 )             25.0     27 Mar 2021 00:39    138    |  107    |  11     ----------------------------<  99     4.1     |  23     |  0.76     Ca    8.5        27 Mar 2021 00:39  Phos  4.3       27 Mar 2021 00:39  Mg     1.8       27 Mar 2021 00:39      PT/INR - ( 27 Mar 2021 00:39 )   PT: 11.7 sec;   INR: 0.97 ratio         PTT - ( 27 Mar 2021 00:39 )  PTT:28.9 sec  CAPILLARY BLOOD GLUCOSE              ___________________________________________________  MICRO  Recent Cultures:    ___________________________________________________  MEDICATIONS  (STANDING):  aspirin enteric coated 81 milliGRAM(s) Oral daily  buPROPion XL . 150 milliGRAM(s) Oral daily  chlorhexidine 2% Cloths 1 Application(s) Topical <User Schedule>  ciprofloxacin     Tablet 500 milliGRAM(s) Oral every 12 hours  enoxaparin Injectable 40 milliGRAM(s) SubCutaneous daily  estradiol 0.05 mG/24Hr(s) Patch 1 Patch Transdermal <User Schedule>  influenza   Vaccine 0.5 milliLiter(s) IntraMuscular once  magnesium sulfate  IVPB 2 Gram(s) IV Intermittent once  metoprolol tartrate 25 milliGRAM(s) Oral every 12 hours  polyethylene glycol 3350 17 Gram(s) Oral daily  senna 2 Tablet(s) Oral at bedtime    MEDICATIONS  (PRN):  acetaminophen   Tablet .. 650 milliGRAM(s) Oral every 6 hours PRN Mild Pain (1 - 3)  oxyCODONE    IR 5 milliGRAM(s) Oral every 4 hours PRN Severe Pain (7 - 10)

## 2021-03-27 NOTE — PROGRESS NOTE ADULT - ASSESSMENT
49 y/o female w/ a PMHx of depression presenting s/p fall w/ amputation of the left ring finger s/p reimplantation requiring frequent neurovascular checks and leech therapy    PLAN:    Neuro: acute post-op pain, depression  - Monitor mental status  - Pain control as needed with acetaminophen and oxycodone  - Home Wellbutrin    Resp: no acute issues  - Monitor pulse oximeter  - Out of bed to chair, ambulate as tolerated, and incentive spirometry to prevent atelectasis    CV: no acute issues  - Monitor hemodynamics  - Metoprolol 25 mg BID   - q2hr leech therapy for reimplanted finger     GI: no acute issues  - Regular diet as tolerated  - Bowel regimen with senna & Miralax    Renal: no acute issues  - Monitor I&Os  - Monitor electrolytes and replete as necessary    Heme: s/p finger reimplantation  - Trend H/H q6hr, transfuse PRN for Hb < 8  - Lovenox for VTE prophylaxis  - ASA 81 mg PO daily     ID: leech therapy  - Monitor for clinical evidence of active infection  - Empiric ciprofloxacin while on leech therapy    Endo: no acute issues  - Monitor glucose on BMP    Code Status: Full code    Disposition: SICU, stable for transfer to floor pending discussion with primary team

## 2021-03-27 NOTE — PROGRESS NOTE ADULT - ASSESSMENT
ASSESSMENT/PLAN:   MICHEL ONEIL is a 50yFemale s/p fall w/ amputation of the left ring finger now s/p L RF replant on 3/20. Recovering appropriately in SICU    - repeat CBCs, transfuse as needed  - Ok to change underlying marge and ABDs as needed  - Patient should have dorsal blocking splint on when not being leeched  - ASA 81 mg PO daily  - Continue leech therapy to replanted digit, switch to q6 leeches   - Empiric ciprofloxacin while on leech therapy  - Continue Neurovascular checks q1  - Pain control  - LUE elevation, bleeding to be expected  - Continue DVT prophylaxis  - Possible downgrade tomorrow pending finger status on q6 leeches today     Plastic Surgery   [facility]: [id number]  [facility]: [phone]

## 2021-03-28 ENCOUNTER — TRANSCRIPTION ENCOUNTER (OUTPATIENT)
Age: 51
End: 2021-03-28

## 2021-03-28 LAB
ANION GAP SERPL CALC-SCNC: 11 MMOL/L — SIGNIFICANT CHANGE UP (ref 5–17)
APTT BLD: 28.6 SEC — SIGNIFICANT CHANGE UP (ref 27.5–35.5)
BLD GP AB SCN SERPL QL: NEGATIVE — SIGNIFICANT CHANGE UP
BUN SERPL-MCNC: 12 MG/DL — SIGNIFICANT CHANGE UP (ref 7–23)
CALCIUM SERPL-MCNC: 8.6 MG/DL — SIGNIFICANT CHANGE UP (ref 8.4–10.5)
CHLORIDE SERPL-SCNC: 105 MMOL/L — SIGNIFICANT CHANGE UP (ref 96–108)
CO2 SERPL-SCNC: 23 MMOL/L — SIGNIFICANT CHANGE UP (ref 22–31)
CREAT SERPL-MCNC: 0.81 MG/DL — SIGNIFICANT CHANGE UP (ref 0.5–1.3)
GLUCOSE SERPL-MCNC: 105 MG/DL — HIGH (ref 70–99)
HCT VFR BLD CALC: 25 % — LOW (ref 34.5–45)
HGB BLD-MCNC: 8.3 G/DL — LOW (ref 11.5–15.5)
INR BLD: 0.9 RATIO — SIGNIFICANT CHANGE UP (ref 0.88–1.16)
MAGNESIUM SERPL-MCNC: 1.8 MG/DL — SIGNIFICANT CHANGE UP (ref 1.6–2.6)
MCHC RBC-ENTMCNC: 30.4 PG — SIGNIFICANT CHANGE UP (ref 27–34)
MCHC RBC-ENTMCNC: 33.2 GM/DL — SIGNIFICANT CHANGE UP (ref 32–36)
MCV RBC AUTO: 91.6 FL — SIGNIFICANT CHANGE UP (ref 80–100)
NRBC # BLD: 0 /100 WBCS — SIGNIFICANT CHANGE UP (ref 0–0)
PHOSPHATE SERPL-MCNC: 4.4 MG/DL — SIGNIFICANT CHANGE UP (ref 2.5–4.5)
PLATELET # BLD AUTO: 262 K/UL — SIGNIFICANT CHANGE UP (ref 150–400)
POTASSIUM SERPL-MCNC: 3.7 MMOL/L — SIGNIFICANT CHANGE UP (ref 3.5–5.3)
POTASSIUM SERPL-SCNC: 3.7 MMOL/L — SIGNIFICANT CHANGE UP (ref 3.5–5.3)
PROTHROM AB SERPL-ACNC: 10.8 SEC — SIGNIFICANT CHANGE UP (ref 10.6–13.6)
RBC # BLD: 2.73 M/UL — LOW (ref 3.8–5.2)
RBC # FLD: 17.2 % — HIGH (ref 10.3–14.5)
RH IG SCN BLD-IMP: NEGATIVE — SIGNIFICANT CHANGE UP
SODIUM SERPL-SCNC: 139 MMOL/L — SIGNIFICANT CHANGE UP (ref 135–145)
WBC # BLD: 5.81 K/UL — SIGNIFICANT CHANGE UP (ref 3.8–10.5)
WBC # FLD AUTO: 5.81 K/UL — SIGNIFICANT CHANGE UP (ref 3.8–10.5)

## 2021-03-28 PROCEDURE — 99232 SBSQ HOSP IP/OBS MODERATE 35: CPT

## 2021-03-28 RX ORDER — POTASSIUM CHLORIDE 20 MEQ
40 PACKET (EA) ORAL ONCE
Refills: 0 | Status: COMPLETED | OUTPATIENT
Start: 2021-03-28 | End: 2021-03-28

## 2021-03-28 RX ORDER — MAGNESIUM SULFATE 500 MG/ML
2 VIAL (ML) INJECTION ONCE
Refills: 0 | Status: COMPLETED | OUTPATIENT
Start: 2021-03-28 | End: 2021-03-28

## 2021-03-28 RX ADMIN — Medication 500 MILLIGRAM(S): at 07:22

## 2021-03-28 RX ADMIN — Medication 81 MILLIGRAM(S): at 11:01

## 2021-03-28 RX ADMIN — Medication 40 MILLIEQUIVALENT(S): at 06:08

## 2021-03-28 RX ADMIN — CHLORHEXIDINE GLUCONATE 1 APPLICATION(S): 213 SOLUTION TOPICAL at 07:22

## 2021-03-28 RX ADMIN — BUPROPION HYDROCHLORIDE 150 MILLIGRAM(S): 150 TABLET, EXTENDED RELEASE ORAL at 11:02

## 2021-03-28 RX ADMIN — Medication 50 GRAM(S): at 06:08

## 2021-03-28 RX ADMIN — Medication 1 PATCH: at 07:19

## 2021-03-28 RX ADMIN — Medication 1 PATCH: at 19:34

## 2021-03-28 RX ADMIN — ENOXAPARIN SODIUM 40 MILLIGRAM(S): 100 INJECTION SUBCUTANEOUS at 11:32

## 2021-03-28 RX ADMIN — Medication 25 MILLIGRAM(S): at 06:07

## 2021-03-28 RX ADMIN — Medication 500 MILLIGRAM(S): at 18:16

## 2021-03-28 NOTE — DISCHARGE NOTE PROVIDER - NSDCFUADDINST_GEN_ALL_CORE_FT
Keep dressing on your finger until your appointment with Dr. Valdes on Tuesday 4/6. Do not get your hand wet.     Keep splint on at all times.     Take antibiotic prescription for 1 more day. Your next dose is due at 6pm on 3/29, then take every 12 hours after that.     Take pain medication as needed. You may alternate tylenol and motrin for pain as well.     You may call [phone] to schedule appointment with Dr. Valdes for next week.

## 2021-03-28 NOTE — PROGRESS NOTE ADULT - ASSESSMENT
49 y/o female w/ a PMHx of depression presenting s/p fall w/ amputation of the left ring finger s/p reimplantation requiring frequent neurovascular checks and leech therapy    PLAN:    Neuro: acute post-op pain, depression  - Monitor mental status  - Pain control as needed with acetaminophen and oxycodone  - Home Wellbutrin    Resp: no acute issues  - Monitor pulse oximeter  - Out of bed to chair, ambulate as tolerated, and incentive spirometry to prevent atelectasis    CV: no acute issues  - Monitor hemodynamics  - Metoprolol 25 mg BID   - q6hr leech therapy for reimplanted finger     GI: no acute issues  - Regular diet as tolerated  - Bowel regimen with senna & Miralax    Renal: no acute issues  - Monitor I&Os  - Monitor electrolytes and replete as necessary    Heme: s/p finger reimplantation  - Trend H/H q6hr, transfuse PRN for Hb < 8  - Lovenox for VTE prophylaxis  - ASA 81 mg PO daily     ID: leech therapy  - Monitor for clinical evidence of active infection  - Empiric ciprofloxacin while on leech therapy    Endo: no acute issues  - Monitor glucose on BMP    Code Status: Full code    Disposition: SICU, stable for transfer to floor

## 2021-03-28 NOTE — PROVIDER CONTACT NOTE (OTHER) - SITUATION
+doppler pulses noted on Lt 4th finger. Lt 4th digit noted with increased area of duskyness.
pt  used the restroom and then reported feeling "dizzy." Pt sat in chair in bathroom and had syncope episode.
L4 cool and increasingly blue tinged
Unable to obtain pulse ox on left 4th finger, no change in color or appearance of finger

## 2021-03-28 NOTE — DISCHARGE NOTE PROVIDER - CARE PROVIDER_API CALL
Aiden Valdes)  Plastic Surgery  [address] [place] [address]  Phone: [phone]  Fax: [phone]  Follow Up Time: 1 week

## 2021-03-28 NOTE — DISCHARGE NOTE PROVIDER - NSDCMRMEDTOKEN_GEN_ALL_CORE_FT
estradiol 0.05 mg/24 hours twice weekly transdermal film, extended release: 1 patch transdermal 2 times a week  Wellbutrin  mg/24 hours oral tablet, extended release: 1 tab(s) orally every 24 hours   ciprofloxacin 500 mg oral tablet: 1 tab(s) orally every 12 hours MDD:2  estradiol 0.05 mg/24 hours twice weekly transdermal film, extended release: 1 patch transdermal 2 times a week  oxyCODONE 5 mg oral tablet: 1 tab(s) orally every 6 hours, As Needed -Severe Pain (7 - 10) MDD:4  Wellbutrin  mg/24 hours oral tablet, extended release: 1 tab(s) orally every 24 hours

## 2021-03-28 NOTE — PROVIDER CONTACT NOTE (OTHER) - REASON
Syncope episode in bathroom
L4 cool and blue
Lt 4th digit noted with increased area of duskyness.
unable to obtain pulse ox signal on left 4th finger

## 2021-03-28 NOTE — PROVIDER CONTACT NOTE (OTHER) - RECOMMENDATIONS
Enrique LIVINGSTON; Woodrow CARDONA at bedside. Pt awoke. Stable BP. Pt A&Ox4. Labs sent.
assess patient at bedside
assess patient

## 2021-03-28 NOTE — PROGRESS NOTE ADULT - ASSESSMENT
ASSESSMENT/PLAN:   MICHEL ONEIL is a 50yFemale s/p fall w/ amputation of the left ring finger now s/p L RF replant on 3/20. Recovering appropriately in SICU    - repeat CBCs, transfuse as needed  - Ok to change underlying marge and ABDs as needed  - Patient should have dorsal blocking splint on when not being leeched  - ASA 81 mg PO daily  - Try off leeches today   - Continue Neurovascular checks q1  - Pain control  - LUE elevation, bleeding to be expected  - Continue DVT prophylaxis    Plastic Surgery   [facility]: [id number]  [facility]: [phone]

## 2021-03-28 NOTE — PROGRESS NOTE ADULT - SUBJECTIVE AND OBJECTIVE BOX
SICU Daily Progress Note  =====================================================  Interval/Overnight Events:  - Expanded to Q6 hour leeches  - Q1 capillary refill checks    HPI: 51 y/o female w/ a PMHx of depression who presented today to [facility] after slipping down the stairs. Patient attempted to grab the railing to break her fall, which resulted in amputation of the left ring finger. She was subsequently transferred to [facility] for reimplantation. Case was uneventful. Patient admitted to SICU post-operatively for leech therapy and frequent neurovascular checks. Patient currently denies fevers, headache, dizziness, weakness, shortness of breath, chest pain, abdominal pain, or nausea/vomiting.        MEDICATIONS:   --------------------------------------------------------------------------------------  Neurologic Medications  acetaminophen   Tablet .. 650 milliGRAM(s) Oral every 6 hours PRN Mild Pain (1 - 3)  buPROPion XL . 150 milliGRAM(s) Oral daily  oxyCODONE    IR 5 milliGRAM(s) Oral every 4 hours PRN Severe Pain (7 - 10)    Respiratory Medications    Cardiovascular Medications  metoprolol tartrate 25 milliGRAM(s) Oral every 12 hours    Gastrointestinal Medications  polyethylene glycol 3350 17 Gram(s) Oral daily  senna 2 Tablet(s) Oral at bedtime    Genitourinary Medications    Hematologic/Oncologic Medications  aspirin enteric coated 81 milliGRAM(s) Oral daily  enoxaparin Injectable 40 milliGRAM(s) SubCutaneous daily  influenza   Vaccine 0.5 milliLiter(s) IntraMuscular once    Antimicrobial/Immunologic Medications  ciprofloxacin     Tablet 500 milliGRAM(s) Oral every 12 hours    Endocrine/Metabolic Medications  estradiol 0.05 mG/24Hr(s) Patch 1 Patch Transdermal <User Schedule>    Topical/Other Medications  chlorhexidine 2% Cloths 1 Application(s) Topical <User Schedule>    --------------------------------------------------------------------------------------    VITAL SIGNS, INS/OUTS (last 24 hours):  --------------------------------------------------------------------------------------  Vital Signs Last 24 Hrs  T(C): 36.5 (28 Mar 2021 00:00), Max: 36.9 (27 Mar 2021 03:00)  T(F): 97.7 (28 Mar 2021 00:00), Max: 98.4 (27 Mar 2021 03:00)  HR: 66 (28 Mar 2021 00:00) (59 - 84)  BP: 109/59 (28 Mar 2021 00:00) (106/64 - 140/64)  BP(mean): 78 (28 Mar 2021 00:00) (78 - 105)  RR: 21 (28 Mar 2021 00:00) (16 - 35)  SpO2: 99% (28 Mar 2021 00:00) (98% - 100%)  --------------------------------------------------------------------------------------    EXAM  NEUROLOGY  Exam: Normal, NAD, alert, oriented x3, no focal deficits    RESPIRATORY  Exam: Nonlabored, CTABL, no wheezes, ronchi, or rales. Normal respiratory effort.     CARDIOVASCULAR  Exam: Normotensive, RRR, no M/R/G     GI/NUTRITION  Exam: Abdomen soft, NT/ND, no rebound or guarding.  Current Diet:  reg    VASCULAR  Exam: Extremities warm, well-perfused. Adequate capillary refill, finger dusky but with dopplerable signal and cap refill    HEMATOLOGIC  [x] VTE Prophylaxis: aspirin enteric coated 81 milliGRAM(s) Oral daily  enoxaparin Injectable 40 milliGRAM(s) SubCutaneous daily        INFECTIOUS DISEASE  Antimicrobials/Immunologic Medications:  ciprofloxacin     Tablet 500 milliGRAM(s) Oral every 12 hours  influenza   Vaccine 0.5 milliLiter(s) IntraMuscular once      Tubes/Lines/Drains  ***  [x] Peripheral IV  [] Central Venous Line     	[] R	[] L	[] IJ	[] Fem	[] SC	Date Placed:   [] Arterial Line		[] R	[] L	[] Fem	[] Rad	[] Ax	Date Placed:   [] PICC		[] Midline		[] Mediport  [] Urinary Catheter		  [x] Necessity of urinary, arterial, and venous catheters discussed    LABS  --------------------------------------------------------------------------------------                        8.3    6.84  )-----------( 213      ( 27 Mar 2021 00:39 )             25.0     03-27    138  |  107  |  11  ----------------------------<  99  4.1   |  23  |  0.76    Ca    8.5      27 Mar 2021 00:39  Phos  4.3     03-27  Mg     1.8     03-27      PT/INR - ( 27 Mar 2021 00:39 )   PT: 11.7 sec;   INR: 0.97 ratio         PTT - ( 27 Mar 2021 00:39 )  PTT:28.9 sec    --------------------------------------------------------------------------------------

## 2021-03-28 NOTE — PROVIDER CONTACT NOTE (OTHER) - BACKGROUND
Pt s/p fall requiring leech therapy for finger
Pt s/p fall requiring leech therapy for finger
s/p L ring finger reimplantation

## 2021-03-28 NOTE — PROVIDER CONTACT NOTE (OTHER) - ACTION/TREATMENT ORDERED:
To intervention ordered at this time. Continue to monitor
sicu team and md sanchez aware. [email or web address] surgery team paged. pt seen by surgery team. leech therapy q6hrs restarted as ordered with good results noted thereafter leech treatment
team will come assess patient
Decrease in H/H seen from labs sent. Q6 labs ordered. 1L bolus given. 1uPRBC given.

## 2021-03-28 NOTE — PROGRESS NOTE ADULT - SUBJECTIVE AND OBJECTIVE BOX
Plastic Surgery Progress Note (pg [facility]: [id number], [facility]: [phone])    SUBJECTIVE  The patient was seen and examined. No acute events overnight. Finger with good Doppler signal.     OBJECTIVE  ___________________________________________________  VITAL SIGNS / I&O's   Vital Signs Last 24 Hrs  T(C): 36.2 (28 Mar 2021 07:30), Max: 36.5 (27 Mar 2021 11:00)  T(F): 97.2 (28 Mar 2021 07:30), Max: 97.7 (27 Mar 2021 11:00)  HR: 65 (28 Mar 2021 09:00) (60 - 84)  BP: 123/74 (28 Mar 2021 08:00) (109/59 - 136/82)  BP(mean): 92 (28 Mar 2021 08:00) (78 - 105)  RR: 21 (28 Mar 2021 09:00) (15 - 34)  SpO2: 100% (28 Mar 2021 09:00) (98% - 100%)      27 Mar 2021 07:01  -  28 Mar 2021 07:00  --------------------------------------------------------  IN:    IV PiggyBack: 50 mL    Oral Fluid: 800 mL  Total IN: 850 mL    OUT:  Total OUT: 0 mL    Total NET: 850 mL      28 Mar 2021 07:01  -  28 Mar 2021 09:09  --------------------------------------------------------  IN:    Oral Fluid: 350 mL  Total IN: 350 mL    OUT:  Total OUT: 0 mL    Total NET: 350 mL        ___________________________________________________  PHYSICAL EXAM    NAD  L Ring Finger: appropriate oozing. There is good CR and color to digit.     ___________________________________________________  LABS                        8.3    5.81  )-----------( 262      ( 28 Mar 2021 03:23 )             25.0     28 Mar 2021 03:23    139    |  105    |  12     ----------------------------<  105    3.7     |  23     |  0.81     Ca    8.6        28 Mar 2021 03:23  Phos  4.4       28 Mar 2021 03:23  Mg     1.8       28 Mar 2021 03:23      PT/INR - ( 28 Mar 2021 03:23 )   PT: 10.8 sec;   INR: 0.90 ratio         PTT - ( 28 Mar 2021 03:23 )  PTT:28.6 sec  CAPILLARY BLOOD GLUCOSE              ___________________________________________________  MICRO  Recent Cultures:    ___________________________________________________  MEDICATIONS  (STANDING):  aspirin enteric coated 81 milliGRAM(s) Oral daily  buPROPion XL . 150 milliGRAM(s) Oral daily  chlorhexidine 2% Cloths 1 Application(s) Topical <User Schedule>  ciprofloxacin     Tablet 500 milliGRAM(s) Oral every 12 hours  enoxaparin Injectable 40 milliGRAM(s) SubCutaneous daily  estradiol 0.05 mG/24Hr(s) Patch 1 Patch Transdermal <User Schedule>  influenza   Vaccine 0.5 milliLiter(s) IntraMuscular once  metoprolol tartrate 25 milliGRAM(s) Oral every 12 hours  polyethylene glycol 3350 17 Gram(s) Oral daily  senna 2 Tablet(s) Oral at bedtime    MEDICATIONS  (PRN):  acetaminophen   Tablet .. 650 milliGRAM(s) Oral every 6 hours PRN Mild Pain (1 - 3)  oxyCODONE    IR 5 milliGRAM(s) Oral every 4 hours PRN Severe Pain (7 - 10)

## 2021-03-28 NOTE — DISCHARGE NOTE PROVIDER - DETAILS OF MALNUTRITION DIAGNOSIS/DIAGNOSES
PRIMARY CARE VISIT        Patient name : Josh Henriquez   MRN number: [id number]   YOB: 1939       Reason for visit:   Chief Complaint   Patient presents with   • Follow-up     refill on medication     Quality     Adult Wellness CI height documented, discussion of regular exercise, exercising regularly, printed information given for activities, discussion of nutritional quality of diet, patient education given about proper diet, not using alcohol, colonoscopy performed: 07/14/2014, no tobacco use, does not have feelings of hopelessness (PHQ-2), no Anhedonia (PHQ-2), not taking medication for depression, pain scale level reviewed, has not fallen within the last 12 months, able to walk, surrogate decision maker documented and taking aspirin     History of Present Illness  For F/U. Gout stable. No joint pain or swelling.  No urinary symptoms.No change in the bowels.  Had labs done in 03/2018.  Taking BP meds. Tolerating well.      Review of Systems       Review of Systems   Constitutional: Negative for appetite change, chills, diaphoresis, fatigue and fever.   HENT: Negative for congestion, ear pain, hearing loss, nosebleeds, sinus pressure, sneezing, sore throat, tinnitus and voice change.    Eyes: Negative for pain, redness and visual disturbance.   Respiratory: Negative for cough, chest tightness, shortness of breath and wheezing.    Cardiovascular: Negative for chest pain and leg swelling.   Gastrointestinal: Negative for abdominal pain, blood in stool, constipation and diarrhea.   Endocrine: Negative for cold intolerance, heat intolerance, polydipsia and polyuria.   Genitourinary: Negative for difficulty urinating, frequency, hematuria and urgency.   Musculoskeletal: Negative for back pain and myalgias.   Skin: Negative for rash.   Allergic/Immunologic: Negative for environmental allergies, food allergies and immunocompromised state.   Neurological: Negative for dizziness, tremors, seizures, syncope,  speech difficulty, light-headedness and numbness.   Hematological: Negative for adenopathy.   Psychiatric/Behavioral: Negative for agitation, behavioral problems, confusion, hallucinations, sleep disturbance and suicidal ideas.        Allergies  ALLERGIES:  Allergies no known allergies    Current Meds    Current Outpatient Medications   Medication Sig Dispense Refill   • felodipine (PLENDIL) 10 MG 24 hr tablet Take 1 tablet by mouth daily. 90 tablet 3   • metoPROLOL succinate (TOPROL-XL) 100 MG 24 hr tablet Take 1 tablet by mouth daily. 90 tablet 3   • hydrochlorothiazide (HYDRODIURIL) 25 MG tablet take one tablet by mouth every day     • triamcinolone (ARISTOCORT) 0.1 % cream APPLY SPARINGLY AND MASSAGE IN TWICE DAILY       No current facility-administered medications for this visit.      .       Past Medical History  Past Medical History:   Diagnosis Date   • Essential (primary) hypertension        Surgical History  Past Surgical History:   Procedure Laterality Date   • Spine surgery         Family History  No family history on file.    Social History  Social History     Tobacco Use   • Smoking status: Never Smoker   • Smokeless tobacco: Never Used   Substance Use Topics   • Alcohol use: Never     Frequency: Never   • Drug use: Never               Vitals    Visit Vitals  /70   Pulse 78   Temp 98 °F (36.7 °C)   Ht 5' 10\" (1.778 m)   Wt 114.8 kg (253 lb 1.4 oz)   SpO2 98%   BMI 36.31 kg/m²              Physical Exam Constitutional: alert, in no acute distress and current vital signs reviewed . Obese.   Head and Face: atraumatic, no deformities, normocephalic, normal facies.   Eyes: no discharge, normal conjunctiva, no eyelid swelling, no ptosis and the sclerae were normal. pupils equal, round and reactive to light and accommodation, conjugate gaze and extraocular movements were intact.   ENT: normal appearing outer ear, normal appearing nose. examination of the tympanic membrane showed normal landmarks, normal  appearing external canal. nasal mucosa moist and pink, no nasal discharge. normal lips. oral mucosa pink and moist, no oral lesions.   Neck: normal appearing neck, supple neck and no mass was seen. thyroid not enlarged and no thyroid nodules.   Pulmonary: no respiratory distress, normal respiratory rate and effort and no accessory muscle use. breath sounds clear to auscultation bilaterally.   Cardiovascular: normal rate, no murmurs were heard, regular rhythm, normal S1 and normal S2. edema was not present in the lower extremities.   Abdomen: soft, nontender, nondistended, normal bowel sounds and no abdominal mass. no hepatomegaly and no splenomegaly. no umbilical hernia was discovered.   Musculoskeletal: normal gait. no musculoskeletal erythema was seen, no joint swelling seen and no joint tenderness was elicited. no scoliosis. normal range of motion. there was no joint instability noted. muscle strength and tone were normal.   Genitourinary: the scrotum was normal and the testicles were not swollen.   Neurologic: cranial nerves grossly intact. normal DTRs. no sensory deficits noted, no diabetic neuropathy was noted. no coordination deficits. normal gait. muscle strength and tone were normal.   Skin, Hair, Nails: normal skin color and pigmentation and no rash. no foot ulcers and no skin ulcer was seen. normal skin turgor. no clubbing or cyanosis of the fingernails.            Assessment/ PLAN  Visit for preventive health examination      Encounter for screening for malignant neoplasm of prostate    - PSA, TOTAL (SCREENING)    Obesity, Class II, BMI 35-39.9    - THYROID STIMULATING HORMONE REFLEX; Future    Essential hypertension    - COMPREHENSIVE METABOLIC PANEL; Future  - LIPID PANEL WITH REFLEX; Future  - URINALYSIS WITH MICRO & CULTURE IF INDICATED; Future    Stage 2 chronic kidney disease due to benign hypertension      Chronic gout without tophus, unspecified cause, unspecified site    - CBC & AUTO  DIFFERENTIAL; Future  - URIC ACID; Future    Other hyperlipidemia    Plan: Report of the blood tests reviewed.      Gout/Arthritis: Uric acid elevated. Continue Allopurinol.     Obesity: Weight goal reviewed.Diet and exercise discussed.     CKD-2: Monitor renal functions.Avoid nephrotoxic agents.  Report of colonoscopy from 2014 reviewed.Need to repeat in 5 years.   Medical compliance with plan discussed and risks of non-compliance reviewed.   Patient education completed on disease process, etiology & prognosis.   Patient expresses understanding of the plan.   Proper usage and side effects of medications reviewed & discussed.   Refer to orders.   Return to clinic as clinically indicated as discussed with patient who verbalized understanding of & agreement with the plan.   Written handout given.   Hypertension: Uncontrolled.   patient education completed on exercise, diet and weight loss.   discussed standard DASH diet.   continued present medication. Don't want additional meds.  recommended to check blood pressure at home.   compliance with medication that is given.   discussed the importance of medications.   Recommended to reduce caffeine intake.   recommended to stop NSAIDS.   Hyperlipidemia: Not controlled. Not taking statin.Continue to monitor.            RETURN TO OFFICE  No follow-ups on file.        Dino Wills MD  04/10/19   This patient has been assessed with a concern for Malnutrition and was treated during this hospitalization for the following Nutrition diagnosis/diagnoses:     -  03/22/2021: Underweight (BMI < 19)   This patient has been assessed with a concern for Malnutrition and was treated during this hospitalization for the following Nutrition diagnosis/diagnoses:     -  03/22/2021: Underweight (BMI < 19)    This patient has been assessed with a concern for Malnutrition and was treated during this hospitalization for the following Nutrition diagnosis/diagnoses:     -  03/22/2021: Underweight (BMI < 19)

## 2021-03-28 NOTE — DISCHARGE NOTE PROVIDER - NSDCCPCAREPLAN_GEN_ALL_CORE_FT
PRINCIPAL DISCHARGE DIAGNOSIS  Diagnosis: Finger amputation, no complication  Assessment and Plan of Treatment:

## 2021-03-28 NOTE — PROVIDER CONTACT NOTE (OTHER) - ASSESSMENT
Pt in NAD, vitals WNL
pt  used the restroom and then reported feeling "dizzy." Pt sat in chair in bathroom and had syncope episode. Pt transported to bed. + Pulse.
Pt in NAD, vitals WNL. Dopplerable oulse on L4, Cap refill <3 seconds.

## 2021-03-28 NOTE — DISCHARGE NOTE PROVIDER - HOSPITAL COURSE
50F LHD slipped down the stairs on 3/20; used her left hand to try to grab the railings and as a result had her left ring finger avulsed from her hand. She was taken to [facility]; Xray and eval by an orthopedic surgeon was performed; deemed an appropriate replantation candidate and was transferred by helicopter to [facility]. Taken to the OR emergently for replantation of severed left ring finger; vein graft from distal volar forearm; repair of radial and ulnar digital nerves with interpositional nerve graft; removal of nail plate. Postoperatively, recovered in the SICU for frequent leech therapy and frequent neurovascular checks. She underwent frequent leech therapy per protocol in the SICU: leeches for every 2 hrs for 5 days, and then switched to every 4 hrs for days 5-7. She was then switched off leech therapy and was monitored frequently for neurovascular checks. A dorsal extension splint was applied on POD#2 and was kept throughout the hospital course. In the immediate postoperative period, patient reported dizziness and bleeding as expected was present from the replanted finger. Patient was transfused with pRBCs per protocol. At the time of discharge, the patient was hemodynamically stable, was tolerating PO diet, was voiding urine and passing stool, was ambulating, and was comfortable with adequate pain control. The patient was instructed to follow up with Dr. Valdes within 1 week after discharge from the hospital. The patient felt comfortable with discharge. The patient had no other issues.   50F LHD slipped down the stairs on 3/20; used her left hand to try to grab the railings and as a result had her left ring finger avulsed from her hand. She was taken to [facility]; Xray and eval by an orthopedic surgeon was performed; deemed an appropriate replantation candidate and was transferred by helicopter to [facility]. Taken to the OR emergently for replantation of severed left ring finger; vein graft from distal volar forearm; repair of radial and ulnar digital nerves with interpositional nerve graft; removal of nail plate. Postoperatively, recovered in the SICU for frequent leech therapy and frequent neurovascular checks. She underwent frequent leech therapy per protocol in the SICU: leeches for every 2 hrs for 5 days, and then switched to every 4 hrs for days 5-7. She was then switched off leech therapy and was monitored frequently for neurovascular checks.     A dorsal extension splint was applied on POD#2 and was kept throughout the hospital course. In the immediate postoperative period, patient reported dizziness and bleeding as expected was present from the replanted finger. Patient was transfused with pRBCs per protocol.     On POD9 leech therapy was discontinued and patient was deemed stable for discharge, with a plan to follow up with Dr. Valdes in the office on Tuesday 4/6. Dressing placed on replanted digit, to remain in place until follow up appointment. At the time of discharge, the patient was hemodynamically stable, was tolerating PO diet, was voiding urine and passing stool, was ambulating, and was comfortable with adequate pain control. The patient felt comfortable with discharge. The patient had no other issues.

## 2021-03-28 NOTE — DISCHARGE NOTE PROVIDER - NSDCCPTREATMENT_GEN_ALL_CORE_FT
PRINCIPAL PROCEDURE  Procedure: Replantation of digit proximal amputation  Findings and Treatment:

## 2021-03-29 ENCOUNTER — TRANSCRIPTION ENCOUNTER (OUTPATIENT)
Age: 51
End: 2021-03-29

## 2021-03-29 VITALS
SYSTOLIC BLOOD PRESSURE: 128 MMHG | HEART RATE: 80 BPM | DIASTOLIC BLOOD PRESSURE: 98 MMHG | TEMPERATURE: 98 F | OXYGEN SATURATION: 94 % | RESPIRATION RATE: 20 BRPM

## 2021-03-29 LAB
ANION GAP SERPL CALC-SCNC: 12 MMOL/L — SIGNIFICANT CHANGE UP (ref 5–17)
APTT BLD: 30.1 SEC — SIGNIFICANT CHANGE UP (ref 27.5–35.5)
BUN SERPL-MCNC: 12 MG/DL — SIGNIFICANT CHANGE UP (ref 7–23)
CALCIUM SERPL-MCNC: 8.9 MG/DL — SIGNIFICANT CHANGE UP (ref 8.4–10.5)
CHLORIDE SERPL-SCNC: 107 MMOL/L — SIGNIFICANT CHANGE UP (ref 96–108)
CO2 SERPL-SCNC: 22 MMOL/L — SIGNIFICANT CHANGE UP (ref 22–31)
CREAT SERPL-MCNC: 0.79 MG/DL — SIGNIFICANT CHANGE UP (ref 0.5–1.3)
GLUCOSE SERPL-MCNC: 84 MG/DL — SIGNIFICANT CHANGE UP (ref 70–99)
HCT VFR BLD CALC: 25.8 % — LOW (ref 34.5–45)
HGB BLD-MCNC: 8.4 G/DL — LOW (ref 11.5–15.5)
INR BLD: 0.9 RATIO — SIGNIFICANT CHANGE UP (ref 0.88–1.16)
MAGNESIUM SERPL-MCNC: 2 MG/DL — SIGNIFICANT CHANGE UP (ref 1.6–2.6)
MCHC RBC-ENTMCNC: 30.7 PG — SIGNIFICANT CHANGE UP (ref 27–34)
MCHC RBC-ENTMCNC: 32.6 GM/DL — SIGNIFICANT CHANGE UP (ref 32–36)
MCV RBC AUTO: 94.2 FL — SIGNIFICANT CHANGE UP (ref 80–100)
NRBC # BLD: 0 /100 WBCS — SIGNIFICANT CHANGE UP (ref 0–0)
PHOSPHATE SERPL-MCNC: 4.1 MG/DL — SIGNIFICANT CHANGE UP (ref 2.5–4.5)
PLATELET # BLD AUTO: 292 K/UL — SIGNIFICANT CHANGE UP (ref 150–400)
POTASSIUM SERPL-MCNC: 4 MMOL/L — SIGNIFICANT CHANGE UP (ref 3.5–5.3)
POTASSIUM SERPL-SCNC: 4 MMOL/L — SIGNIFICANT CHANGE UP (ref 3.5–5.3)
PROTHROM AB SERPL-ACNC: 10.9 SEC — SIGNIFICANT CHANGE UP (ref 10.6–13.6)
RBC # BLD: 2.74 M/UL — LOW (ref 3.8–5.2)
RBC # FLD: 17.1 % — HIGH (ref 10.3–14.5)
SODIUM SERPL-SCNC: 141 MMOL/L — SIGNIFICANT CHANGE UP (ref 135–145)
WBC # BLD: 4.99 K/UL — SIGNIFICANT CHANGE UP (ref 3.8–10.5)
WBC # FLD AUTO: 4.99 K/UL — SIGNIFICANT CHANGE UP (ref 3.8–10.5)

## 2021-03-29 PROCEDURE — P9040: CPT

## 2021-03-29 PROCEDURE — 82553 CREATINE MB FRACTION: CPT

## 2021-03-29 PROCEDURE — 82947 ASSAY GLUCOSE BLOOD QUANT: CPT

## 2021-03-29 PROCEDURE — 86900 BLOOD TYPING SEROLOGIC ABO: CPT

## 2021-03-29 PROCEDURE — 85027 COMPLETE CBC AUTOMATED: CPT

## 2021-03-29 PROCEDURE — C1713: CPT

## 2021-03-29 PROCEDURE — 82435 ASSAY OF BLOOD CHLORIDE: CPT

## 2021-03-29 PROCEDURE — 86923 COMPATIBILITY TEST ELECTRIC: CPT

## 2021-03-29 PROCEDURE — 97760 ORTHOTIC MGMT&TRAING 1ST ENC: CPT

## 2021-03-29 PROCEDURE — 97165 OT EVAL LOW COMPLEX 30 MIN: CPT

## 2021-03-29 PROCEDURE — 82803 BLOOD GASES ANY COMBINATION: CPT

## 2021-03-29 PROCEDURE — 84100 ASSAY OF PHOSPHORUS: CPT

## 2021-03-29 PROCEDURE — P9016: CPT

## 2021-03-29 PROCEDURE — 85014 HEMATOCRIT: CPT

## 2021-03-29 PROCEDURE — 86850 RBC ANTIBODY SCREEN: CPT

## 2021-03-29 PROCEDURE — 85610 PROTHROMBIN TIME: CPT

## 2021-03-29 PROCEDURE — C1762: CPT

## 2021-03-29 PROCEDURE — 82550 ASSAY OF CK (CPK): CPT

## 2021-03-29 PROCEDURE — C9399: CPT

## 2021-03-29 PROCEDURE — 83735 ASSAY OF MAGNESIUM: CPT

## 2021-03-29 PROCEDURE — 84295 ASSAY OF SERUM SODIUM: CPT

## 2021-03-29 PROCEDURE — 86901 BLOOD TYPING SEROLOGIC RH(D): CPT

## 2021-03-29 PROCEDURE — C1889: CPT

## 2021-03-29 PROCEDURE — 85730 THROMBOPLASTIN TIME PARTIAL: CPT

## 2021-03-29 PROCEDURE — 36430 TRANSFUSION BLD/BLD COMPNT: CPT

## 2021-03-29 PROCEDURE — 99231 SBSQ HOSP IP/OBS SF/LOW 25: CPT

## 2021-03-29 PROCEDURE — 82330 ASSAY OF CALCIUM: CPT

## 2021-03-29 PROCEDURE — 84132 ASSAY OF SERUM POTASSIUM: CPT

## 2021-03-29 PROCEDURE — 85018 HEMOGLOBIN: CPT

## 2021-03-29 PROCEDURE — 99285 EMERGENCY DEPT VISIT HI MDM: CPT | Mod: 25

## 2021-03-29 PROCEDURE — 84484 ASSAY OF TROPONIN QUANT: CPT

## 2021-03-29 PROCEDURE — 83605 ASSAY OF LACTIC ACID: CPT

## 2021-03-29 PROCEDURE — 86769 SARS-COV-2 COVID-19 ANTIBODY: CPT

## 2021-03-29 PROCEDURE — 80048 BASIC METABOLIC PNL TOTAL CA: CPT

## 2021-03-29 RX ORDER — CIPROFLOXACIN LACTATE 400MG/40ML
1 VIAL (ML) INTRAVENOUS
Qty: 3 | Refills: 0
Start: 2021-03-29 | End: 2021-03-29

## 2021-03-29 RX ORDER — OXYCODONE HYDROCHLORIDE 5 MG/1
1 TABLET ORAL
Qty: 8 | Refills: 0
Start: 2021-03-29 | End: 2021-03-30

## 2021-03-29 RX ADMIN — Medication 500 MILLIGRAM(S): at 05:25

## 2021-03-29 RX ADMIN — OXYCODONE HYDROCHLORIDE 5 MILLIGRAM(S): 5 TABLET ORAL at 05:00

## 2021-03-29 RX ADMIN — CHLORHEXIDINE GLUCONATE 1 APPLICATION(S): 213 SOLUTION TOPICAL at 05:25

## 2021-03-29 RX ADMIN — Medication 1 PATCH: at 12:17

## 2021-03-29 RX ADMIN — Medication 1 PATCH: at 08:26

## 2021-03-29 RX ADMIN — Medication 25 MILLIGRAM(S): at 05:25

## 2021-03-29 RX ADMIN — BUPROPION HYDROCHLORIDE 150 MILLIGRAM(S): 150 TABLET, EXTENDED RELEASE ORAL at 11:26

## 2021-03-29 RX ADMIN — Medication 81 MILLIGRAM(S): at 12:17

## 2021-03-29 RX ADMIN — OXYCODONE HYDROCHLORIDE 5 MILLIGRAM(S): 5 TABLET ORAL at 03:42

## 2021-03-29 RX ADMIN — ENOXAPARIN SODIUM 40 MILLIGRAM(S): 100 INJECTION SUBCUTANEOUS at 11:27

## 2021-03-29 NOTE — PROGRESS NOTE ADULT - NUTRITIONAL ASSESSMENT
This patient has been assessed with a concern for Malnutrition and has been determined to have a diagnosis/diagnoses of Underweight (BMI < 19).    This patient is being managed with:   Diet Regular-  Entered: Mar 20 2021 10:35PM    

## 2021-03-29 NOTE — PROGRESS NOTE ADULT - ATTENDING COMMENTS
Episode of syncope likely vasovagal responded to fluid bolus  Mild drop in HCT s/p transfusion with appropriate response, continue to monitor  Start her home dose Wellbutrin  Cipro for abx prevention  Pharmacologic DVT ppx with Lovenox asa
Remains hemodynamically stable  Finger perfused  Requiring intermittent PRBC transfusion  Pain well controlled  Pharmacologic DVT ppx
Remains hemodynamically stable  Finger perfused  Requiring intermittent PRBC transfusion  Pain well controlled  Pharmacologic DVT ppx  N MD Woodrow
Remains hemodynamically stable  Finger perfused on hein therapy  Continue to require almost daily PRBC transfusion  Pain well controlled  Infection ppx with Cipro  Pharmacologic DVT ppx  N MD Woodrow
Remains hemodynamically stable  Plan to keep off hein therapy today   Continue frequent vascular checks  Infection ppx with Cipro for now  Pharmacologic DVT ppx, HCT stable  DENISE Troy MD
Remains hemodynamically stable  Difficult to assess spo2, doppler +  Continue vascular checks  Infection ppx with Cipro  Pharmacologic DVT ppx  N MD Woodrow
SICU ATTENDING ATTESTATION    I have seen and examined this patient on multidisciplinary SICU rounds thismorning. I have reviewed all new labs, imaging and reports. I have participated in formulating the plan for the day, and have read and agree with the history, ROS, exam, assessment and plan as stated above, with my additions listed below:     POD 1 from reimplantation of left 4th digit with nerve and artery recon without venous recon. Pain well controlle diwth tylenol and oxycodone. restart home wellbutrin. on room air, hemodynamically normal. on regular diet. ok to be off monitors with q4 hours vitals. d/c alvarado catheter. digit with q2 hours hein therapy. no need for continues StO2 as there is no escalation of care if perfusion fails. started on lovenox for dvt ppx and ASA for arterial patency. On Cipro for hein ppx.     Total time spent in the care of this patient today (excluding procedures): 25 min                 Over 50% of the total time was spent in discussion and coordination of care with consulting services, dietary and rehab services.       Cintia Ignacio M.D., M.S.  Dept of Trauma, Acute and Critical Care
Pt seen and examined with SICU resident/PA team, agree with above.    - Pt doing well overall. Holding leech therapy today per PRS. Pain control adequate, tolerating po. On home meds for depression.
Remains hemodynamically stable  Finger perfused remains on hein therapy  Continue to require almost daily PRBC transfusion  Pain well controlled  Infection ppx with Cipro  Pharmacologic DVT ppx  N MD Woodrow

## 2021-03-29 NOTE — PROGRESS NOTE ADULT - REASON FOR ADMISSION
left finger amputation/replantation

## 2021-03-29 NOTE — PROGRESS NOTE ADULT - PROVIDER SPECIALTY LIST ADULT
Plastic Surgery
SICU
SICU
Plastic Surgery
SICU
Plastic Surgery
SICU
Plastic Surgery
No

## 2021-03-29 NOTE — CHART NOTE - NSCHARTNOTEFT_GEN_A_CORE
Evaluated pt 6 hours last leech. Pulp with less turgor, no appreciable capillary refill.  Faint volar doppler signal on amputated part.  Sharply debrided nail bed eschar with no bleeding.  Pin prick to pulp and proximal/dorsal without bleeding.  Managed to get a leech to latch dorsally right on the suture line.      Assessment:  Replant POD9.  Had been weening leeches over weekend but restarted yesterday due to some congestion.  Now, appears to have no minimal to no inflow.      Plan:  -continue leeches q2.    discussed with Dr. Valdes

## 2021-03-29 NOTE — PROGRESS NOTE ADULT - SUBJECTIVE AND OBJECTIVE BOX
Plastic Surgery Progress Note (pg [facility]: [id number], [facility]: [phone])    SUBJECTIVE  The patient was seen and examined. No acute events overnight. q6 leech therapy restarted yesterday evening.     OBJECTIVE  ___________________________________________________  VITAL SIGNS / I&O's   Vital Signs Last 24 Hrs  T(C): 36.4 (29 Mar 2021 04:00), Max: 36.6 (29 Mar 2021 00:00)  T(F): 97.5 (29 Mar 2021 04:00), Max: 97.9 (29 Mar 2021 00:00)  HR: 76 (29 Mar 2021 06:00) (65 - 95)  BP: 108/49 (29 Mar 2021 04:00) (108/49 - 143/79)  BP(mean): 71 (29 Mar 2021 04:00) (71 - 106)  RR: 26 (29 Mar 2021 06:00) (15 - 26)  SpO2: 100% (29 Mar 2021 06:00) (96% - 100%)      28 Mar 2021 07:01  -  29 Mar 2021 07:00  --------------------------------------------------------  IN:    Oral Fluid: 1500 mL  Total IN: 1500 mL    OUT:  Total OUT: 0 mL    Total NET: 1500 mL        ___________________________________________________  PHYSICAL EXAM    General: NAD  L Ring Finger: proximal ecchymosis/congestion near suture line. +doppler signal.     ___________________________________________________  LABS                        8.4    4.99  )-----------( 292      ( 29 Mar 2021 01:16 )             25.8     29 Mar 2021 01:16    141    |  107    |  12     ----------------------------<  84     4.0     |  22     |  0.79     Ca    8.9        29 Mar 2021 01:16  Phos  4.1       29 Mar 2021 01:16  Mg     2.0       29 Mar 2021 01:16      PT/INR - ( 29 Mar 2021 01:16 )   PT: 10.9 sec;   INR: 0.90 ratio         PTT - ( 29 Mar 2021 01:16 )  PTT:30.1 sec    ___________________________________________________  MEDICATIONS  (STANDING):  aspirin enteric coated 81 milliGRAM(s) Oral daily  buPROPion XL . 150 milliGRAM(s) Oral daily  chlorhexidine 2% Cloths 1 Application(s) Topical <User Schedule>  ciprofloxacin     Tablet 500 milliGRAM(s) Oral every 12 hours  enoxaparin Injectable 40 milliGRAM(s) SubCutaneous daily  estradiol 0.05 mG/24Hr(s) Patch 1 Patch Transdermal <User Schedule>  influenza   Vaccine 0.5 milliLiter(s) IntraMuscular once  metoprolol tartrate 25 milliGRAM(s) Oral every 12 hours  polyethylene glycol 3350 17 Gram(s) Oral daily  senna 2 Tablet(s) Oral at bedtime    MEDICATIONS  (PRN):  acetaminophen   Tablet .. 650 milliGRAM(s) Oral every 6 hours PRN Mild Pain (1 - 3)  oxyCODONE    IR 5 milliGRAM(s) Oral every 4 hours PRN Severe Pain (7 - 10)

## 2021-03-29 NOTE — PROGRESS NOTE ADULT - SUBJECTIVE AND OBJECTIVE BOX
HISTORY  51 y/o female w/ a PMHx of depression who presented today to [facility] after slipping down the stairs. Patient attempted to grab the railing to break her fall, which resulted in amputation of the left ring finger. She was subsequently transferred to [facility] for reimplantation. Case was uneventful. Patient admitted to SICU post-operatively for leech therapy and frequent neurovascular checks. Patient currently denies fevers, headache, dizziness, weakness, shortness of breath, chest pain, abdominal pain, or nausea/vomiting.    24 HOUR EVENTS:  - Dc'd leech tx, doppler checks q1hr  - lost doppler signal, resumed leeched tx  - No acute issues overnight    SUBJECTIVE/ROS:  [x ] A ten-point review of systems was otherwise negative except as noted.  [ ] Due to altered mental status/intubation, subjective information were not able to be obtained from the patient. History was obtained, to the extent possible, from review of the chart and collateral sources of information.      NEURO  Exam: A&Ox3  Meds: acetaminophen   Tablet .. 650 milliGRAM(s) Oral every 6 hours PRN Mild Pain (1 - 3)  buPROPion XL . 150 milliGRAM(s) Oral daily  oxyCODONE    IR 5 milliGRAM(s) Oral every 4 hours PRN Severe Pain (7 - 10)      RESPIRATORY  RR: 16 (03-29-21 @ 00:00) (15 - 24)  SpO2: 100% (03-29-21 @ 00:00) (96% - 100%)  Wt(kg): --  Exam: Unlabored         CARDIOVASCULAR  HR: 73 (03-29-21 @ 00:00) (60 - 85)  BP: 118/58 (03-29-21 @ 00:00) (118/58 - 143/79)  BP(mean): 82 (03-29-21 @ 00:00) (82 - 106)  ABP: --  ABP(mean): --  Wt(kg): --  CVP(cm H2O): --      Exam: RRR  Perfusion     [c ]Adequate   [ ]Inadequate  Mentation   [c ]Normal       [ ]Reduced  Extremities  [c ]Warm         [ ]Cool  Volume Status [ ]Hypervolemic [x ]Euvolemic [ ]Hypovolemic  Meds: metoprolol tartrate 25 milliGRAM(s) Oral every 12 hours        GI/NUTRITION  Exam: soft, NDNT   Meds: polyethylene glycol 3350 17 Gram(s) Oral daily  senna 2 Tablet(s) Oral at bedtime      GENITOURINARY  I&O's Detail    03-27 @ 07:01  -  03-28 @ 07:00  --------------------------------------------------------  IN:    IV PiggyBack: 50 mL    Oral Fluid: 800 mL  Total IN: 850 mL    OUT:  Total OUT: 0 mL    Total NET: 850 mL      03-28 @ 07:01  -  03-29 @ 00:38  --------------------------------------------------------  IN:    Oral Fluid: 1350 mL  Total IN: 1350 mL    OUT:  Total OUT: 0 mL    Total NET: 1350 mL          03-28    139  |  105  |  12  ----------------------------<  105<H>  3.7   |  23  |  0.81    Ca    8.6      28 Mar 2021 03:23  Phos  4.4     03-28  Mg     1.8     03-28      [ ] Croft catheter, indication:   Meds:       HEMATOLOGIC  Meds: aspirin enteric coated 81 milliGRAM(s) Oral daily  enoxaparin Injectable 40 milliGRAM(s) SubCutaneous daily    [x] VTE Prophylaxis                        8.3    5.81  )-----------( 262      ( 28 Mar 2021 03:23 )             25.0     PT/INR - ( 28 Mar 2021 03:23 )   PT: 10.8 sec;   INR: 0.90 ratio         PTT - ( 28 Mar 2021 03:23 )  PTT:28.6 sec  Transfusion     [ ] PRBC   [ ] Platelets   [ ] FFP   [ ] Cryoprecipitate      INFECTIOUS DISEASES  T(C): 36.6 (03-29-21 @ 00:00), Max: 36.6 (03-29-21 @ 00:00)  Wt(kg): --  WBC Count: 5.81 K/uL (03-28 @ 03:23)    Recent Cultures:    Meds: ciprofloxacin     Tablet 500 milliGRAM(s) Oral every 12 hours  influenza   Vaccine 0.5 milliLiter(s) IntraMuscular once        ENDOCRINE  Capillary Blood Glucose    Meds: estradiol 0.05 mG/24Hr(s) Patch 1 Patch Transdermal <User Schedule>    OTHER MEDICATIONS:  chlorhexidine 2% Cloths 1 Application(s) Topical <User Schedule>   HISTORY  49 y/o female w/ a PMHx of depression who presented today to [facility] after slipping down the stairs. Patient attempted to grab the railing to break her fall, which resulted in amputation of the left ring finger. She was subsequently transferred to [facility] for reimplantation. Case was uneventful. Patient admitted to SICU post-operatively for leech therapy and frequent neurovascular checks. Patient currently denies fevers, headache, dizziness, weakness, shortness of breath, chest pain, abdominal pain, or nausea/vomiting.    24 HOUR EVENTS:  - Dc'd leech tx, doppler checks q1hr  - lost doppler signal, resumed leeched tx      SUBJECTIVE/ROS:  [x ] A ten-point review of systems was otherwise negative except as noted.  [ ] Due to altered mental status/intubation, subjective information were not able to be obtained from the patient. History was obtained, to the extent possible, from review of the chart and collateral sources of information.      NEURO  Exam: A&Ox3  Meds: acetaminophen   Tablet .. 650 milliGRAM(s) Oral every 6 hours PRN Mild Pain (1 - 3)  buPROPion XL . 150 milliGRAM(s) Oral daily  oxyCODONE    IR 5 milliGRAM(s) Oral every 4 hours PRN Severe Pain (7 - 10)      RESPIRATORY  RR: 16 (03-29-21 @ 00:00) (15 - 24)  SpO2: 100% (03-29-21 @ 00:00) (96% - 100%)  Wt(kg): --  Exam: Unlabored         CARDIOVASCULAR  HR: 73 (03-29-21 @ 00:00) (60 - 85)  BP: 118/58 (03-29-21 @ 00:00) (118/58 - 143/79)  BP(mean): 82 (03-29-21 @ 00:00) (82 - 106)  ABP: --  ABP(mean): --  Wt(kg): --  CVP(cm H2O): --      Exam: RRR  Perfusion     [c ]Adequate   [ ]Inadequate  Mentation   [c ]Normal       [ ]Reduced  Extremities  [c ]Warm         [ ]Cool  Volume Status [ ]Hypervolemic [x ]Euvolemic [ ]Hypovolemic  Meds: metoprolol tartrate 25 milliGRAM(s) Oral every 12 hours        GI/NUTRITION  Exam: soft, NDNT   Meds: polyethylene glycol 3350 17 Gram(s) Oral daily  senna 2 Tablet(s) Oral at bedtime      GENITOURINARY  I&O's Detail    03-27 @ 07:01  -  03-28 @ 07:00  --------------------------------------------------------  IN:    IV PiggyBack: 50 mL    Oral Fluid: 800 mL  Total IN: 850 mL    OUT:  Total OUT: 0 mL    Total NET: 850 mL      03-28 @ 07:01  -  03-29 @ 00:38  --------------------------------------------------------  IN:    Oral Fluid: 1350 mL  Total IN: 1350 mL    OUT:  Total OUT: 0 mL    Total NET: 1350 mL          03-28    139  |  105  |  12  ----------------------------<  105<H>  3.7   |  23  |  0.81    Ca    8.6      28 Mar 2021 03:23  Phos  4.4     03-28  Mg     1.8     03-28      [ ] Croft catheter, indication:   Meds:       HEMATOLOGIC  Meds: aspirin enteric coated 81 milliGRAM(s) Oral daily  enoxaparin Injectable 40 milliGRAM(s) SubCutaneous daily    [x] VTE Prophylaxis                        8.3    5.81  )-----------( 262      ( 28 Mar 2021 03:23 )             25.0     PT/INR - ( 28 Mar 2021 03:23 )   PT: 10.8 sec;   INR: 0.90 ratio         PTT - ( 28 Mar 2021 03:23 )  PTT:28.6 sec  Transfusion     [ ] PRBC   [ ] Platelets   [ ] FFP   [ ] Cryoprecipitate      INFECTIOUS DISEASES  T(C): 36.6 (03-29-21 @ 00:00), Max: 36.6 (03-29-21 @ 00:00)  Wt(kg): --  WBC Count: 5.81 K/uL (03-28 @ 03:23)    Recent Cultures:    Meds: ciprofloxacin     Tablet 500 milliGRAM(s) Oral every 12 hours  influenza   Vaccine 0.5 milliLiter(s) IntraMuscular once        ENDOCRINE  Capillary Blood Glucose    Meds: estradiol 0.05 mG/24Hr(s) Patch 1 Patch Transdermal <User Schedule>    OTHER MEDICATIONS:  chlorhexidine 2% Cloths 1 Application(s) Topical <User Schedule>

## 2021-03-29 NOTE — PROGRESS NOTE ADULT - ASSESSMENT
51 y/o female w/ a PMHx of depression presenting s/p fall w/ amputation of the left ring finger s/p reimplantation requiring frequent neurovascular checks and leech therapy    PLAN:    Neuro: acute post-op pain, depression  - Monitor mental status  - Pain control as needed with acetaminophen and oxycodone  - Home Wellbutrin    Resp: no acute issues  - Monitor pulse oximeter  - Out of bed to chair, ambulate as tolerated, and incentive spirometry to prevent atelectasis    CV: no acute issues  - Monitor hemodynamics  - Metoprolol 25 mg BID   - q6hr leech therapy for reimplanted finger     GI: no acute issues  - Regular diet as tolerated  - Bowel regimen with senna & Miralax    Renal: no acute issues  - Monitor I&Os  - Monitor electrolytes and replete as necessary    Heme: s/p finger reimplantation  - Trend H/H q6hr, transfuse PRN for Hb < 8  - Lovenox for VTE prophylaxis  - ASA 81 mg PO daily     ID: leech therapy  - q6hrs w/ q1hr doppler checks   - Monitor for clinical evidence of active infection  - Empiric ciprofloxacin while on leech therapy    Endo: no acute issues  - Monitor glucose on BMP    Code Status: Full code    Disposition: SICU

## 2021-03-29 NOTE — DISCHARGE NOTE NURSING/CASE MANAGEMENT/SOCIAL WORK - PATIENT PORTAL LINK FT
You can access the FollowMyHealth Patient Portal offered by [facility] by registering at the following website: [email or web address]. By joining [facility]’s FollowMyHealth portal, you will also be able to view your health information using other applications (apps) compatible with our system.

## 2021-03-29 NOTE — PROGRESS NOTE ADULT - ASSESSMENT
ASSESSMENT/PLAN:   MICHEL ONEIL is a 50yFemale s/p fall w/ amputation of the left ring finger now s/p L RF replant on 3/20. Recovering appropriately in SICU    - repeat CBCs, transfuse as needed  - Ok to change underlying marge and ABDs as needed  - Patient should have dorsal blocking splint on when not being leeched, will contact OT to possibly make new splint with less wrist flexion  - ASA 81 mg PO daily  - Try off leeches again today   - Continue Neurovascular checks q1  - Pain control  - LUE elevation, bleeding to be expected  - Continue DVT prophylaxis    Plastic Surgery   [facility]: [id number]  [facility]: [phone]

## 2021-04-01 RX ORDER — BUPROPION HYDROCHLORIDE 150 MG/1
0 TABLET, EXTENDED RELEASE ORAL
Qty: 0 | Refills: 0 | DISCHARGE

## 2021-04-02 ENCOUNTER — OUTPATIENT (OUTPATIENT)
Dept: OUTPATIENT SERVICES | Facility: HOSPITAL | Age: 51
LOS: 1 days | End: 2021-04-02

## 2021-04-02 VITALS
WEIGHT: 110.89 LBS | RESPIRATION RATE: 18 BRPM | OXYGEN SATURATION: 98 % | TEMPERATURE: 98 F | HEART RATE: 90 BPM | SYSTOLIC BLOOD PRESSURE: 118 MMHG | HEIGHT: 65.5 IN | DIASTOLIC BLOOD PRESSURE: 78 MMHG

## 2021-04-02 DIAGNOSIS — Z98.890 OTHER SPECIFIED POSTPROCEDURAL STATES: Chronic | ICD-10-CM

## 2021-04-02 DIAGNOSIS — S68.115A COMPLETE TRAUMATIC METACARPOPHALANGEAL AMPUTATION OF LEFT RING FINGER, INITIAL ENCOUNTER: ICD-10-CM

## 2021-04-02 DIAGNOSIS — Z01.812 ENCOUNTER FOR PREPROCEDURAL LABORATORY EXAMINATION: ICD-10-CM

## 2021-04-02 PROBLEM — Z78.9 OTHER SPECIFIED HEALTH STATUS: Chronic | Status: INACTIVE | Noted: 2021-03-20 | Resolved: 2021-03-21

## 2021-04-02 LAB
HCG UR QL: NEGATIVE — SIGNIFICANT CHANGE UP
HCT VFR BLD CALC: 26.6 % — LOW (ref 34.5–45)
HGB BLD-MCNC: 8.6 G/DL — LOW (ref 11.5–15.5)
MCHC RBC-ENTMCNC: 31.5 PG — SIGNIFICANT CHANGE UP (ref 27–34)
MCHC RBC-ENTMCNC: 32.3 GM/DL — SIGNIFICANT CHANGE UP (ref 32–36)
MCV RBC AUTO: 97.4 FL — SIGNIFICANT CHANGE UP (ref 80–100)
NRBC # BLD: 0 /100 WBCS — SIGNIFICANT CHANGE UP
NRBC # FLD: 0 K/UL — SIGNIFICANT CHANGE UP
PLATELET # BLD AUTO: 451 K/UL — HIGH (ref 150–400)
RBC # BLD: 2.73 M/UL — LOW (ref 3.8–5.2)
RBC # FLD: 15.8 % — HIGH (ref 10.3–14.5)
WBC # BLD: 5.76 K/UL — SIGNIFICANT CHANGE UP (ref 3.8–10.5)
WBC # FLD AUTO: 5.76 K/UL — SIGNIFICANT CHANGE UP (ref 3.8–10.5)

## 2021-04-02 RX ORDER — BUPROPION HYDROCHLORIDE 150 MG/1
1 TABLET, EXTENDED RELEASE ORAL
Qty: 0 | Refills: 0 | DISCHARGE

## 2021-04-02 NOTE — H&P PST ADULT - NSICDXPASTMEDICALHX_GEN_ALL_CORE_FT
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

## 2021-04-02 NOTE — H&P PST ADULT - NSICDXFAMILYHX_GEN_ALL_CORE_FT
FAMILY HISTORY:  Father  Still living? Unknown  FH: breast cancer, Age at diagnosis: Age Unknown    Mother  Still living? Unknown  FH: colon cancer, Age at diagnosis: Age Unknown

## 2021-04-02 NOTE — H&P PST ADULT - NSICDXPROBLEM_GEN_ALL_CORE_FT
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    Problem: Encounter for preprocedure screening laboratory testing for COVID-19  Assessment and Plan: Patient aware of need for COVID testing prior to procedure and advised to co ordinate with surgeon.

## 2021-04-02 NOTE — H&P PST ADULT - EYES
[facility]  Complex Care Plan  About Me  Patient Name:  Stefanie Velazquez    YOB: 1942  Age:   75 year old   Milady MRN: [id number] Telephone Information:     Home Phone [phone]   Mobile [phone]       Address:    [address] [facility]  [address]  [place] [address] Email address:  [email or web address]      Emergency Contact(s)  Name Relationship Lgl Grd Work Phone Home Phone Mobile Phone   1. SARAI FARRIS Sister  none [phone] none   2. KASANDRA SUTTON Sister  none [phone] none   3. RASHARD MARTINEZ* Relative  none [phone]           Primary language:  English     needed? No   [employer]:  [phone] op. 1  Other communication barriers: Yes, as documented ([place])  Preferred Method of Communication:  Phone  Current living arrangement: I live alone  Mobility Status/ Medical Equipment: Independent w/Device  Other information to know about me:    Health Maintenance  Health Maintenance Reviewed: Due/Overdue   Health Maintenance Due   Topic Date Due     MAMMO SCREEN Q2 YR (SYSTEM ASSIGNED)  12/19/2009     MICROALBUMIN Q1 YEAR  03/26/2015     EYE EXAM Q1 YEAR  11/17/2015     ADVANCE DIRECTIVE PLANNING Q5 YRS  07/18/2016     LIPID MONITORING Q1 YEAR  09/16/2016         My Access Plan  Medical Emergency 911   Primary Clinic Line [facility]- [phone]   24 Hour Appointment Line [phone] or  [phone]) (toll-free)   24 Hour Nurse Line [phone] (toll-free)   Preferred Urgent Care     Preferred Hospital [place] [place]  [phone]   Preferred Pharmacy [id number] - [place], [place] - [address] [facility]     Behavioral Health Crisis Line [facility], [phone] or 911     My Care Team Members  Patient Care Team       Relationship Specialty Notifications Start End    Sandra Morales MD PCP - General Family Practice  7/18/16     Phone: [phone] Fax:  [phone]         [facility] [address] [facility] [address]    Lauren Varma, RN Clinic Care Coordinator  Admissions 8/9/16     Phone: [phone] Fax: [phone]        Eunice iDop, RN  [facility]    9/6/16     Comment:  SN x1 week    Phone: [phone]         [facility]    9/6/16     Phone: [phone]         [facility]     9/6/16     Angelika-[employer] Volunteer  coordinator Other (see comments)   9/6/16     Phone: [phone]    Lori Pope-Health Care Agent     9/6/16     Comment:  Assists with transportation, appointments-Niece    Phone: [phone]         Ruth Chaudhari-Sister     9/6/16     Comment:  Lives by her, 85 years old, provides support & visits, has an easier time communicating than Concha    Phone: [phone]         Krystal-Financial Worker [employer]     11/7/16     Phone: [phone]         Elimi     12/1/16     Comment:  starts 12/1/16    Phone: [phone]              My Care Plans  Self Management and Treatment Plan  Goals and (Comments)  Goal #1: I will continue to live in the community independently as evidenced by maintaining my health at present level thru diet, exercise, and medication compliance.      80% of goal reached        Action Plans on File: None  Advance Care Plans/Directives Type:        My Medical and Care Information  Problem List   Patient Active Problem List   Diagnosis     Hypothyroidism     bmi 40     Chronic ischemic heart disease     Generalized osteoarthrosis, unspecified site     HEARING LOSS CONDUCTIVE, COMBINED TYPE     Esophageal reflux     Osteoporosis     RC-moderate (AHI 12, LSat 60%); REM RDI-73     Neuropathy (H)     Hyperlipidemia LDL goal <100     CKD (chronic kidney disease) stage 3, GFR 30-59 ml/min     Rectal cancer- newly diagnosed adenoCA rectum Jan 2011     Advanced directives, counseling/discussion      Anemia     Type 2 diabetes mellitus with diabetic nephropathy (H)     Radiation therapy complication     Vitamin B 12 deficiency     Vitamin D deficiency     Fracture of femur, distal, left, closed (H)     Dysuria     Bowel and bladder incontinence     Cellulitis of lower extremity, bilateral     Failure of outpatient treatment     Benign essential hypertension     Health Care Home     Chronic diarrhea     Restless leg syndrome      Current Medications and Allergies:  See printed Medication Report.    Care Coordination Start Date: 09/21/16   Frequency of Care Coordination: in 10 days   Form Last Updated: 06/17/2017           detailed exam PERRL/EOMI/pupil L/pupil R

## 2021-04-02 NOTE — H&P PST ADULT - MUSCULOSKELETAL
left hand with mild swelling, positive radial pulse, pt able to move all fingers, left 4th finger with pin in plave dressing clean and dry, left arm sling in place/no calf tenderness/normal strength details… detailed exam left hand with mild swelling, positive radial pulse, pt able to move all fingers, left 4th finger with pin in place dressing clean and dry, left arm sling in place/no calf tenderness/normal strength

## 2021-04-02 NOTE — H&P PST ADULT - ASSESSMENT
49 yo female with history of complete traumatic metacarpophalangeal amputation of left ring finge 49 yo female with history of complete traumatic metacarpophalangeal amputation of left ring finger

## 2021-04-02 NOTE — H&P PST ADULT - REASON FOR ADMISSION
"I am having an amputation of my left right finger" "I am having an amputation of my left ring finger"

## 2021-04-02 NOTE — H&P PST ADULT - HISTORY OF PRESENT ILLNESS
49 yo female with history of left 4th finger injury in March 2021.  Per patient slid down the stairs and left 4th finger got caught on the railing.  Patient initially evaluated at [facility] then [facility] s/p amputation and replant of finger on 3/20/2021.  Per patient had leech therapy for 10 days with improvement.  Patient states on Monday noted finger to shrink up and tip turned black in color.  Patient now presents to [facility] for preop evaluation for completion amputation of left ring finger 51 yo female with history of left 4th finger injury in March 2021.  Per patient slid down the stairs and left 4th finger got caught on the railing.  Patient initially evaluated at [facility] then [facility] s/p amputation and replant of finger on 3/20/2021.  Per patient had leech therapy for 10 days with improvement.  Patient states on Monday noted finger to shrink up and tip turned black in color.  Patient now presents to [facility] for preop evaluation for completion amputation of left ring finger.

## 2021-04-03 DIAGNOSIS — Z01.818 ENCOUNTER FOR OTHER PREPROCEDURAL EXAMINATION: ICD-10-CM

## 2021-04-04 ENCOUNTER — APPOINTMENT (OUTPATIENT)
Dept: DISASTER EMERGENCY | Facility: CLINIC | Age: 51
End: 2021-04-04

## 2021-04-04 LAB — SARS-COV-2 N GENE NPH QL NAA+PROBE: NOT DETECTED

## 2021-04-05 PROBLEM — F32.9 MAJOR DEPRESSIVE DISORDER, SINGLE EPISODE, UNSPECIFIED: Chronic | Status: ACTIVE | Noted: 2021-03-20

## 2021-04-05 PROBLEM — S68.115A: Chronic | Status: ACTIVE | Noted: 2021-04-02

## 2021-04-05 PROBLEM — Z86.16 PERSONAL HISTORY OF COVID-19: Chronic | Status: ACTIVE | Noted: 2021-04-02

## 2021-04-06 ENCOUNTER — APPOINTMENT (OUTPATIENT)
Dept: PLASTIC SURGERY | Facility: CLINIC | Age: 51
End: 2021-04-06
Payer: COMMERCIAL

## 2021-04-06 ENCOUNTER — TRANSCRIPTION ENCOUNTER (OUTPATIENT)
Age: 51
End: 2021-04-06

## 2021-04-06 VITALS
OXYGEN SATURATION: 98 % | HEART RATE: 110 BPM | WEIGHT: 112 LBS | TEMPERATURE: 98.4 F | HEIGHT: 66 IN | BODY MASS INDEX: 18 KG/M2 | DIASTOLIC BLOOD PRESSURE: 76 MMHG | SYSTOLIC BLOOD PRESSURE: 112 MMHG

## 2021-04-06 VITALS
TEMPERATURE: 98 F | RESPIRATION RATE: 18 BRPM | HEIGHT: 65.5 IN | WEIGHT: 110.89 LBS | OXYGEN SATURATION: 98 % | DIASTOLIC BLOOD PRESSURE: 78 MMHG | SYSTOLIC BLOOD PRESSURE: 118 MMHG | HEART RATE: 90 BPM

## 2021-04-06 PROCEDURE — 99024 POSTOP FOLLOW-UP VISIT: CPT

## 2021-04-06 NOTE — ASU PREOPERATIVE ASSESSMENT, ADULT (IPARK ONLY) - HEIGHT IN INCHES
Returned call to patient, pt reports her rash has worsened now, denies spreading, she \"has no clue\" what it looks like, located on lower back and believes its from sweat, very itchy. Using hydrocortisone ointment without relief.   Pt also c/o of severe left ear pain, \"its swollen inside\" painful when she puts finger in ear.   Pt would like MD suggestions for issues.    5.5

## 2021-04-07 ENCOUNTER — APPOINTMENT (OUTPATIENT)
Dept: PLASTIC SURGERY | Facility: HOSPITAL | Age: 51
End: 2021-04-07
Payer: COMMERCIAL

## 2021-04-07 ENCOUNTER — OUTPATIENT (OUTPATIENT)
Dept: OUTPATIENT SERVICES | Facility: HOSPITAL | Age: 51
LOS: 1 days | Discharge: HOME | End: 2021-04-07

## 2021-04-07 VITALS
OXYGEN SATURATION: 100 % | RESPIRATION RATE: 16 BRPM | HEART RATE: 82 BPM | DIASTOLIC BLOOD PRESSURE: 86 MMHG | SYSTOLIC BLOOD PRESSURE: 118 MMHG

## 2021-04-07 DIAGNOSIS — Z98.890 OTHER SPECIFIED POSTPROCEDURAL STATES: Chronic | ICD-10-CM

## 2021-04-07 DIAGNOSIS — S68.115A COMPLETE TRAUMATIC METACARPOPHALANGEAL AMPUTATION OF LEFT RING FINGER, INITIAL ENCOUNTER: ICD-10-CM

## 2021-04-07 PROCEDURE — 15240 FTH/GFT F/C/C/M/N/AX/G/H/F20: CPT | Mod: 58

## 2021-04-07 PROCEDURE — 26951 AMPUTATION OF FINGER/THUMB: CPT | Mod: 58,F3

## 2021-04-07 RX ORDER — BUPROPION HYDROCHLORIDE 150 MG/1
1 TABLET, EXTENDED RELEASE ORAL
Qty: 0 | Refills: 0 | DISCHARGE

## 2021-04-07 RX ORDER — ERGOCALCIFEROL 1.25 MG/1
1 CAPSULE ORAL
Qty: 0 | Refills: 0 | DISCHARGE

## 2021-04-07 NOTE — BRIEF OPERATIVE NOTE - OPERATION/FINDINGS
Revision amputation of left ring finger with full thickness skin graft harvested from left groin crease. Direct closure of donor site.

## 2021-04-07 NOTE — ASU DISCHARGE PLAN (ADULT/PEDIATRIC) - ASU DC SPECIAL INSTRUCTIONSFT
DRESSING: Please keep dressings dry until follow up. Please keep them clean.   Please follow up with Dr. Valdes 1 week after discharge from the hospital.  You may call [phone] to schedule this appointment.   PAIN: Please take Tylenol and/or Ibuprofen for pain as needed.  PRESCRIPTIONS: none required   Please do not use your left hand for any activities until cleared to do so by Dr. Valdes.

## 2021-04-07 NOTE — ASU DISCHARGE PLAN (ADULT/PEDIATRIC) - CARE PROVIDER_API CALL
Aiden Valdes)  Plastic Surgery  [address] [place] [address]  Phone: [phone]  Fax: [phone]  Follow Up Time: 1 week

## 2021-04-13 ENCOUNTER — APPOINTMENT (OUTPATIENT)
Dept: PLASTIC SURGERY | Facility: CLINIC | Age: 51
End: 2021-04-13
Payer: COMMERCIAL

## 2021-04-13 PROCEDURE — 99024 POSTOP FOLLOW-UP VISIT: CPT

## 2021-04-16 NOTE — HISTORY OF PRESENT ILLNESS
[FreeTextEntry1] : pt is here for post-op visit, replant surgery of left ring finger on 3/20/21, subsequently complicated by replant failure. Complete amputation of left ring finger, 4/7/21.\par

## 2021-04-16 NOTE — REASON FOR VISIT
[Post Op: _________] : a [unfilled] post op visit [Spouse] : spouse [FreeTextEntry1] : replant surgery of left ring finger on 3/20/21, subsequently complicated by replant failure. Complete amputation of left ring finger, 4/7/21.

## 2021-04-16 NOTE — HISTORY OF PRESENT ILLNESS
[FreeTextEntry1] : pt is here for post-op follow up , replant surgery of left ring finger on 3/20/21, subsequently complicated by replant failure. She is here for consult of complete amputation of left ring finger, scheduled for 4/5/21.\par pt state she is doing well, no complaints.

## 2021-04-27 ENCOUNTER — APPOINTMENT (OUTPATIENT)
Dept: PLASTIC SURGERY | Facility: CLINIC | Age: 51
End: 2021-04-27
Payer: COMMERCIAL

## 2021-04-27 PROCEDURE — 99024 POSTOP FOLLOW-UP VISIT: CPT

## 2021-05-06 ENCOUNTER — OUTPATIENT (OUTPATIENT)
Dept: OUTPATIENT SERVICES | Facility: HOSPITAL | Age: 51
LOS: 1 days | End: 2021-05-06
Payer: COMMERCIAL

## 2021-05-06 ENCOUNTER — RESULT REVIEW (OUTPATIENT)
Age: 51
End: 2021-05-06

## 2021-05-06 ENCOUNTER — APPOINTMENT (OUTPATIENT)
Dept: RADIOLOGY | Facility: CLINIC | Age: 51
End: 2021-05-06

## 2021-05-06 DIAGNOSIS — Z98.890 OTHER SPECIFIED POSTPROCEDURAL STATES: Chronic | ICD-10-CM

## 2021-05-06 DIAGNOSIS — S68.119D COMPLETE TRAUMATIC METACARPOPHALANGEAL AMPUTATION OF UNSPECIFIED FINGER, SUBSEQUENT ENCOUNTER: ICD-10-CM

## 2021-05-06 PROCEDURE — 73130 X-RAY EXAM OF HAND: CPT

## 2021-05-06 PROCEDURE — 73130 X-RAY EXAM OF HAND: CPT | Mod: 26,LT

## 2021-05-11 ENCOUNTER — APPOINTMENT (OUTPATIENT)
Dept: PLASTIC SURGERY | Facility: CLINIC | Age: 51
End: 2021-05-11
Payer: COMMERCIAL

## 2021-05-11 PROCEDURE — 99024 POSTOP FOLLOW-UP VISIT: CPT

## 2021-05-13 NOTE — HISTORY OF PRESENT ILLNESS
[FreeTextEntry1] : pt is here for post-op visit, replant surgery of left ring finger on 3/20/21, subsequently complicated by replant failure. Complete amputation of left ring finger, 4/7/21.\par Pt is also here to review xray of the left ring finger 5/6/21 \par

## 2021-05-25 ENCOUNTER — APPOINTMENT (OUTPATIENT)
Dept: PLASTIC SURGERY | Facility: CLINIC | Age: 51
End: 2021-05-25
Payer: COMMERCIAL

## 2021-05-25 VITALS — BODY MASS INDEX: 18 KG/M2 | HEIGHT: 66 IN | TEMPERATURE: 97.7 F | WEIGHT: 112 LBS

## 2021-05-25 PROCEDURE — 99024 POSTOP FOLLOW-UP VISIT: CPT

## 2021-05-25 RX ORDER — GABAPENTIN 300 MG/1
300 CAPSULE ORAL
Qty: 12 | Refills: 0 | Status: ACTIVE | COMMUNITY
Start: 2021-05-25 | End: 1900-01-01

## 2021-05-25 NOTE — HISTORY OF PRESENT ILLNESS
[FreeTextEntry1] : pt is here for post-op visit, replant surgery of left ring finger on 3/20/21, subsequently complicated by replant failure. Complete amputation of left ring finger, 4/7/21.\par \par

## 2021-06-08 ENCOUNTER — APPOINTMENT (OUTPATIENT)
Dept: PLASTIC SURGERY | Facility: CLINIC | Age: 51
End: 2021-06-08
Payer: COMMERCIAL

## 2021-06-08 DIAGNOSIS — S68.119D COMPLETE TRAUMATIC METACARPOPHALANGEAL AMPUTATION OF UNSPECIFIED FINGER, SUBSEQUENT ENCOUNTER: ICD-10-CM

## 2021-06-08 PROCEDURE — 99024 POSTOP FOLLOW-UP VISIT: CPT

## 2021-06-09 PROBLEM — S68.119D TRAUMATIC AMPUTATION OF FINGER, SUBSEQUENT ENCOUNTER: Status: ACTIVE | Noted: 2021-04-16

## 2021-06-22 ENCOUNTER — APPOINTMENT (OUTPATIENT)
Dept: PLASTIC SURGERY | Facility: CLINIC | Age: 51
End: 2021-06-22

## 2021-09-07 NOTE — PATIENT PROFILE ADULT - FALL HARM RISK CONCLUSION
Continue omeprazole and sucralfate/Carafate.    Minimize GI irritants.    Labs today, we will call with results.    Upper endoscopy.    Possible imaging depending on labs.               GERD (Gastroesophageal Reflux Disease)  Lifestyle Changes and Dietary Alterations  What you put in your stomach matters!    Diet  Avoid:  · Coffee  · Soda  · Alcohol  · Acidic fluids (ie: vinegar)  · Spicy foods and hot peppers  · Orange juice, lemonade, tomato juice  · Pre-cooked meals with preservatives such as citric acid      Improve:  · Increase fiber intake  · Increase water intake (not cold)  · Water and other beverages should be room temperature      Lifestyle Changes:  \"Breakfast eat yourself, lunch share with your friends, dinner give to your enemy.\"    · Nothing to eat or drink 3 hours before bedtime except water  · Walk or stand for 30 minutes after meals  · No snacking if possible  · Elevate the head of the bed 30 degrees (use a mattress wedge or blocks under the legs of the bed; a pillow is not sufficient)  · Sleep on your left side  · Avoid water mattresses  · Avoid chewing gum.  Do not eat on the run.  · Eat slowly. There should be 30-40 chewing movements per swallow (count until comfortable)  · Do not talk or laugh while eating  · NO tobacco or alcohol      Stress Reduction:  · Try to control stress at work or home  · Get non-competitive exercise (yoga, pilates, bayron chi, walking)  · Meditation techniques           Fall with Harm Risk

## 2021-09-18 NOTE — ASU DISCHARGE PLAN (ADULT/PEDIATRIC) - A. DRIVE A CAR, OPERATE POWER TOOLS OR MACHINERY
Statement Selected
Olayinka Person DO:  patient seen and evaluated with the resident.  I was present for key portions of the History & Physical, and I agree with the Impression & Plan. 50 yo f pmh HTN, depression and history of suicide attempts, alcohol/benzo abuse, pw overdose. bibems. family found pt unresponsive this morning in house. noted empty liquor and pill bottles. prescribed xanax. fs >60 w/ ems. pt arrives altered, responsive to physical stimuli. perrla. no gross signs of trauma. placed on ET co2 monitoring. narcan trialed without response. pt protecting airway upon arrival, no need for emergent intubation. will chec labs, ct head, tox. will require reassessments and collateral.

## 2021-09-21 ENCOUNTER — APPOINTMENT (OUTPATIENT)
Dept: PLASTIC SURGERY | Facility: CLINIC | Age: 51
End: 2021-09-21

## 2021-09-27 ENCOUNTER — APPOINTMENT (OUTPATIENT)
Dept: PLASTIC SURGERY | Facility: CLINIC | Age: 51
End: 2021-09-27
Payer: COMMERCIAL

## 2021-09-27 VITALS — WEIGHT: 110 LBS | HEIGHT: 66 IN | TEMPERATURE: 97.8 F | BODY MASS INDEX: 17.68 KG/M2

## 2021-09-27 PROCEDURE — XXXXX: CPT

## 2021-09-27 NOTE — REASON FOR VISIT
[Follow-Up: _____] : a [unfilled] follow-up visit [FreeTextEntry1] : replant surgery of left ring finger on 3/20/21, subsequently complicated by replant failure. Complete amputation of left ring finger, 4/7/21.

## 2021-11-13 NOTE — PATIENT PROFILE ADULT - NSPROPOAPRESSUREINJURY_GEN_A_NUR
no Dorsal Nasal Flap Text: The defect edges were debeveled with a #15 scalpel blade.  Given the location of the defect and the proximity to free margins a dorsal nasal flap was deemed most appropriate.  Using a sterile surgical marker, an appropriate dorsal nasal flap was drawn around the defect.    The area thus outlined was incised deep to adipose tissue with a #15 scalpel blade.  The skin margins were undermined to an appropriate distance in all directions utilizing iris scissors.

## 2022-02-11 NOTE — H&P PST ADULT - GUM GEN PE MLT EXAM PC
States has recently noticed strong urine odor not associated with dysuria or urinary frequency/urgency  Plan: UA with reflex culture   not examined

## 2023-08-27 NOTE — OCCUPATIONAL THERAPY INITIAL EVALUATION ADULT - LIVES WITH, PROFILE
chest/complains of pain/discomfort
Pvt home with 1 flight to enter from garage. Bed and bath 1st floor. (I) ADLs and functional transfers without AD/DME. L handed. +/children/spouse

## 2023-09-05 NOTE — OCCUPATIONAL THERAPY INITIAL EVALUATION ADULT - REHAB POTENTIAL, OT EVAL
HPI    CC: Pt states VA is slightly worse. Broke previous glasses. Has had to use   an older pair of glasses and rx sunglasses. Dry eyes OU.    NORMA: 9/14/22    (+) Changes in vision   (-) Pain  (+) Irritation,  dry eye  (-) Itching   (-) Flashes  (-) Floaters  (+) Glasses wearer  (-) CL wearer  (+) Uses eye gtts [facility] PRN, recently ran out    Does patient want a refraction today? If needed    (-) Eye injury  (-) Eye surgery   (-)POHx  (-)FOHx    (+)Pre-DM  Hemoglobin A1C       Date                     Value               Ref Range             Status                01/04/2023               6.0 (H)             4.0 - 5.6 %           Final                  04/28/2022               6.0 (H)             4.0 - 5.6 %           Final                 07/01/2021               6.2 (H)             4.0 - 5.6 %           Final                 Last edited by Alexandra Sweeney, OD on 9/5/2023  3:31 PM.            Assessment /Plan     For exam results, see Encounter Report.    Keratoconjunctivitis sicca, not specified as Sjogren's, bilateral    Prediabetes    Nuclear sclerosis of both eyes    Myopia with astigmatism and presbyopia, bilateral      Educated pt on findings. Recommend to continue ATs TID-QID + payton/gel QHS for added lubrication and comfort. If symptoms worsen or dont improve, RTC. Monitor.      2. No retinopathy noted, OU. Continue proper BS control and annual diabetic eye exams. Monitor yearly.      3. Educated pt on findings. Not visually significant. No need for removal at this time. Monitor yearly.      4. Updated SRx. Monitor yearly.        RTC in 1 year for annual eye exam or sooner if needed.                    
good, to achieve stated therapy goals

## 2024-01-30 NOTE — DIETITIAN INITIAL EVALUATION ADULT. - ORAL INTAKE PTA/DIET HISTORY
Pt reports good appetite PTA, no diet restrictions, biggest meal is lunch, usually snacks throughout day.
Yes

## 2025-04-17 NOTE — BRIEF OPERATIVE NOTE - ASSISTANT(S)
Airway  Date/Time: 4/17/2025 3:44 PM  Reason: elective    Airway not difficult    Staffing  Performed: CRNA   Authorized by: Thong Melgoza MD    Performed by: IAN Medina-NATASHA  Patient location during procedure: OR    Patient Condition  Indications for airway management: anesthesia and airway protection  Patient position: sniffing  Planned trial extubation  Sedation level: deep     Final Airway Details   Preoxygenated: yes  Final airway type: endotracheal airway  Successful airway: ETT  Cuffed: yes   Successful intubation technique: video laryngoscopy  Adjuncts used in placement: intubating stylet and cricoid pressure  Endotracheal tube insertion site: oral  Blade: Valdez  Blade size: #4  ETT size (mm): 7.0  Cormack-Lehane Classification: grade I - full view of glottis  Placement verified by: capnometry   Inital cuff pressure (cm H2O): 21  Measured from: lips  Ventilation between attempts: none  Number of attempts at approach: 1  Number of other approaches attempted: 0           Dr. Rose

## 2025-09-10 ENCOUNTER — APPOINTMENT (OUTPATIENT)
Dept: VASCULAR SURGERY | Facility: CLINIC | Age: 55
End: 2025-09-10

## 2025-09-10 VITALS
OXYGEN SATURATION: 98 % | HEART RATE: 90 BPM | WEIGHT: 110 LBS | DIASTOLIC BLOOD PRESSURE: 90 MMHG | HEIGHT: 66 IN | BODY MASS INDEX: 17.68 KG/M2 | SYSTOLIC BLOOD PRESSURE: 138 MMHG

## 2025-09-10 DIAGNOSIS — I83.10 VARICOSE VEINS OF UNSPECIFIED LOWER EXTREMITY WITH INFLAMMATION: ICD-10-CM

## 2025-09-10 PROCEDURE — 93971 EXTREMITY STUDY: CPT | Mod: RT

## 2025-09-19 ENCOUNTER — NON-APPOINTMENT (OUTPATIENT)
Age: 55
End: 2025-09-19